# Patient Record
Sex: FEMALE | Race: BLACK OR AFRICAN AMERICAN | NOT HISPANIC OR LATINO | Employment: FULL TIME | URBAN - METROPOLITAN AREA
[De-identification: names, ages, dates, MRNs, and addresses within clinical notes are randomized per-mention and may not be internally consistent; named-entity substitution may affect disease eponyms.]

---

## 2017-06-19 ENCOUNTER — HOSPITAL ENCOUNTER (EMERGENCY)
Facility: HOSPITAL | Age: 22
Discharge: LEFT BEFORE DISCHARGE | End: 2017-06-19
Attending: EMERGENCY MEDICINE | Admitting: EMERGENCY MEDICINE

## 2017-06-19 VITALS
WEIGHT: 235 LBS | SYSTOLIC BLOOD PRESSURE: 118 MMHG | HEART RATE: 104 BPM | RESPIRATION RATE: 20 BRPM | TEMPERATURE: 98.9 F | HEIGHT: 64 IN | DIASTOLIC BLOOD PRESSURE: 68 MMHG | OXYGEN SATURATION: 98 % | BODY MASS INDEX: 40.12 KG/M2

## 2017-06-19 DIAGNOSIS — R07.9 CHEST PAIN, UNSPECIFIED TYPE: Primary | ICD-10-CM

## 2017-06-19 PROCEDURE — 99281 EMR DPT VST MAYX REQ PHY/QHP: CPT

## 2017-06-19 PROCEDURE — 93005 ELECTROCARDIOGRAM TRACING: CPT

## 2017-06-19 NOTE — ED PROVIDER NOTES
Subjective   HPI Comments: PT WITH ANTERIOR CHEST PAIN WORSE WITH MOVEMENT OR DEEP BREATH. PT DENIES FEVER, CHILLS OR OTHER COMPLAINTS.     Patient is a 21 y.o. female presenting with chest pain.   History provided by:  Patient  Chest Pain   Pain location:  Substernal area  Pain quality: dull    Pain radiates to:  Does not radiate  Onset quality:  Gradual  Timing:  Constant  Chronicity:  New  Context: breathing    Context: not intercourse    Relieved by:  Nothing  Worsened by:  Coughing, movement and deep breathing  Ineffective treatments:  None tried  Associated symptoms: no abdominal pain, no anxiety, no back pain, no dysphagia, no numbness, no PND and no shortness of breath        Review of Systems   HENT: Negative for trouble swallowing.    Respiratory: Negative for shortness of breath.    Cardiovascular: Positive for chest pain. Negative for PND.   Gastrointestinal: Negative for abdominal pain.   Musculoskeletal: Negative for back pain.   Neurological: Negative for numbness.   All other systems reviewed and are negative.      Past Medical History:   Diagnosis Date   • Gonorrhea     treated no complications       No Known Allergies    History reviewed. No pertinent surgical history.    Family History   Problem Relation Age of Onset   • No Known Problems Father    • Hypertension Mother    • Breast cancer Neg Hx    • Ovarian cancer Neg Hx    • Uterine cancer Neg Hx    • Diabetes Neg Hx        Social History     Social History   • Marital status: Single     Spouse name: N/A   • Number of children: N/A   • Years of education: N/A     Social History Main Topics   • Smoking status: Current Every Day Smoker     Packs/day: 0.50     Years: 0.00   • Smokeless tobacco: None   • Alcohol use No   • Drug use: None   • Sexual activity: Yes     Birth control/ protection: Implant      Comment: Nexplanon inserted 4/2013     Other Topics Concern   • None     Social History Narrative           Objective   Physical Exam    Constitutional: She appears well-developed.   HENT:   Head: Normocephalic and atraumatic.   Cardiovascular: Normal rate, regular rhythm and normal heart sounds.    Pulmonary/Chest: Effort normal and breath sounds normal. No respiratory distress. She has no wheezes.   MILDLY TENDER ANTERIOR CHEST.    Abdominal: Soft. Bowel sounds are normal. She exhibits no distension. There is no tenderness. There is no guarding.   Musculoskeletal: Normal range of motion. She exhibits no edema.   Neurological: She is alert.   Skin: Skin is warm and dry.   Psychiatric: She has a normal mood and affect. Her behavior is normal. Judgment and thought content normal.   Nursing note and vitals reviewed.      Procedures         ED Course  ED Course                  MDM    Final diagnoses:   Chest pain, unspecified type            CARIDAD Lloyd  06/19/17 9852

## 2017-08-03 ENCOUNTER — APPOINTMENT (OUTPATIENT)
Dept: GENERAL RADIOLOGY | Facility: HOSPITAL | Age: 22
End: 2017-08-03

## 2017-08-03 ENCOUNTER — HOSPITAL ENCOUNTER (OUTPATIENT)
Facility: HOSPITAL | Age: 22
Setting detail: OBSERVATION
Discharge: HOME | End: 2017-08-06
Attending: EMERGENCY MEDICINE | Admitting: FAMILY MEDICINE

## 2017-08-03 DIAGNOSIS — R11.2 NON-INTRACTABLE VOMITING WITH NAUSEA, UNSPECIFIED VOMITING TYPE: ICD-10-CM

## 2017-08-03 DIAGNOSIS — R00.0 SINUS TACHYCARDIA: Primary | ICD-10-CM

## 2017-08-03 DIAGNOSIS — R50.9 LOW GRADE FEVER: ICD-10-CM

## 2017-08-03 PROBLEM — R82.5 POSITIVE URINE DRUG SCREEN: Status: ACTIVE | Noted: 2017-08-03

## 2017-08-03 PROBLEM — R11.10 INTRACTABLE VOMITING: Status: ACTIVE | Noted: 2017-08-03

## 2017-08-03 PROBLEM — Z72.0 TOBACCO ABUSE: Chronic | Status: ACTIVE | Noted: 2017-08-03

## 2017-08-03 PROBLEM — E87.6 HYPOKALEMIA: Status: ACTIVE | Noted: 2017-08-03

## 2017-08-03 LAB
ALBUMIN SERPL-MCNC: 4.6 G/DL (ref 3.2–4.8)
ALBUMIN/GLOB SERPL: 1.3 G/DL (ref 1.5–2.5)
ALP SERPL-CCNC: 58 U/L (ref 25–100)
ALT SERPL W P-5'-P-CCNC: 35 U/L (ref 7–40)
AMPHET+METHAMPHET UR QL: NEGATIVE
AMPHETAMINES UR QL: NEGATIVE
ANION GAP SERPL CALCULATED.3IONS-SCNC: 13 MMOL/L (ref 3–11)
AST SERPL-CCNC: 33 U/L (ref 0–33)
B-HCG UR QL: NEGATIVE
BACTERIA UR QL AUTO: ABNORMAL /HPF
BARBITURATES UR QL SCN: NEGATIVE
BASOPHILS # BLD AUTO: 0.01 10*3/MM3 (ref 0–0.2)
BASOPHILS NFR BLD AUTO: 0.2 % (ref 0–1)
BENZODIAZ UR QL SCN: NEGATIVE
BILIRUB SERPL-MCNC: 0.5 MG/DL (ref 0.3–1.2)
BILIRUB UR QL STRIP: ABNORMAL
BUN BLD-MCNC: 10 MG/DL (ref 9–23)
BUN/CREAT SERPL: 12.5 (ref 7–25)
BUPRENORPHINE SERPL-MCNC: NEGATIVE NG/ML
CALCIUM SPEC-SCNC: 9.4 MG/DL (ref 8.7–10.4)
CANNABINOIDS SERPL QL: POSITIVE
CHLORIDE SERPL-SCNC: 105 MMOL/L (ref 99–109)
CLARITY UR: ABNORMAL
CO2 SERPL-SCNC: 20 MMOL/L (ref 20–31)
COCAINE UR QL: NEGATIVE
COLOR UR: ABNORMAL
CREAT BLD-MCNC: 0.8 MG/DL (ref 0.6–1.3)
D-LACTATE SERPL-SCNC: 1 MMOL/L (ref 0.5–2)
DEPRECATED RDW RBC AUTO: 45.7 FL (ref 37–54)
EOSINOPHIL # BLD AUTO: 0.01 10*3/MM3 (ref 0–0.3)
EOSINOPHIL NFR BLD AUTO: 0.2 % (ref 0–3)
ERYTHROCYTE [DISTWIDTH] IN BLOOD BY AUTOMATED COUNT: 13.1 % (ref 11.3–14.5)
GFR SERPL CREATININE-BSD FRML MDRD: 109 ML/MIN/1.73
GLOBULIN UR ELPH-MCNC: 3.6 GM/DL
GLUCOSE BLD-MCNC: 98 MG/DL (ref 70–100)
GLUCOSE UR STRIP-MCNC: NEGATIVE MG/DL
HCT VFR BLD AUTO: 44.3 % (ref 34.5–44)
HGB BLD-MCNC: 15.2 G/DL (ref 11.5–15.5)
HGB UR QL STRIP.AUTO: ABNORMAL
HOLD SPECIMEN: NORMAL
HOLD SPECIMEN: NORMAL
HYALINE CASTS UR QL AUTO: ABNORMAL /LPF
IMM GRANULOCYTES # BLD: 0.01 10*3/MM3 (ref 0–0.03)
IMM GRANULOCYTES NFR BLD: 0.2 % (ref 0–0.6)
INTERNAL NEGATIVE CONTROL: NEGATIVE
INTERNAL POSITIVE CONTROL: POSITIVE
KETONES UR QL STRIP: ABNORMAL
LEUKOCYTE ESTERASE UR QL STRIP.AUTO: NEGATIVE
LIPASE SERPL-CCNC: 31 U/L (ref 6–51)
LYMPHOCYTES # BLD AUTO: 1.56 10*3/MM3 (ref 0.6–4.8)
LYMPHOCYTES NFR BLD AUTO: 33.5 % (ref 24–44)
Lab: NORMAL
MCH RBC QN AUTO: 32.5 PG (ref 27–31)
MCHC RBC AUTO-ENTMCNC: 34.3 G/DL (ref 32–36)
MCV RBC AUTO: 94.9 FL (ref 80–99)
METHADONE UR QL SCN: NEGATIVE
MONOCYTES # BLD AUTO: 0.34 10*3/MM3 (ref 0–1)
MONOCYTES NFR BLD AUTO: 7.3 % (ref 0–12)
NEUTROPHILS # BLD AUTO: 2.72 10*3/MM3 (ref 1.5–8.3)
NEUTROPHILS NFR BLD AUTO: 58.6 % (ref 41–71)
NITRITE UR QL STRIP: NEGATIVE
OPIATES UR QL: NEGATIVE
OXYCODONE UR QL SCN: NEGATIVE
PCP UR QL SCN: NEGATIVE
PH UR STRIP.AUTO: 6 [PH] (ref 5–8)
PLATELET # BLD AUTO: 240 10*3/MM3 (ref 150–450)
PMV BLD AUTO: 9.4 FL (ref 6–12)
POTASSIUM BLD-SCNC: 3.3 MMOL/L (ref 3.5–5.5)
PROPOXYPH UR QL: NEGATIVE
PROT SERPL-MCNC: 8.2 G/DL (ref 5.7–8.2)
PROT UR QL STRIP: ABNORMAL
RBC # BLD AUTO: 4.67 10*6/MM3 (ref 3.89–5.14)
RBC # UR: ABNORMAL /HPF
REF LAB TEST METHOD: ABNORMAL
SODIUM BLD-SCNC: 138 MMOL/L (ref 132–146)
SP GR UR STRIP: 1.04 (ref 1–1.03)
SQUAMOUS #/AREA URNS HPF: ABNORMAL /HPF
TRICYCLICS UR QL SCN: NEGATIVE
TSH SERPL DL<=0.05 MIU/L-ACNC: 0.52 MIU/ML (ref 0.35–5.35)
UROBILINOGEN UR QL STRIP: ABNORMAL
WBC NRBC COR # BLD: 4.65 10*3/MM3 (ref 3.5–10.8)
WBC UR QL AUTO: ABNORMAL /HPF
WHOLE BLOOD HOLD SPECIMEN: NORMAL
WHOLE BLOOD HOLD SPECIMEN: NORMAL

## 2017-08-03 PROCEDURE — 25010000002 KETOROLAC TROMETHAMINE PER 15 MG: Performed by: PHYSICIAN ASSISTANT

## 2017-08-03 PROCEDURE — 83605 ASSAY OF LACTIC ACID: CPT | Performed by: EMERGENCY MEDICINE

## 2017-08-03 PROCEDURE — 71010 HC CHEST PA OR AP: CPT

## 2017-08-03 PROCEDURE — 81015 MICROSCOPIC EXAM OF URINE: CPT | Performed by: EMERGENCY MEDICINE

## 2017-08-03 PROCEDURE — 85025 COMPLETE CBC W/AUTO DIFF WBC: CPT | Performed by: EMERGENCY MEDICINE

## 2017-08-03 PROCEDURE — 96361 HYDRATE IV INFUSION ADD-ON: CPT

## 2017-08-03 PROCEDURE — 25010000002 CEFTRIAXONE PER 250 MG: Performed by: PHYSICIAN ASSISTANT

## 2017-08-03 PROCEDURE — 87086 URINE CULTURE/COLONY COUNT: CPT | Performed by: EMERGENCY MEDICINE

## 2017-08-03 PROCEDURE — 99219 PR INITIAL OBSERVATION CARE/DAY 50 MINUTES: CPT | Performed by: NURSE PRACTITIONER

## 2017-08-03 PROCEDURE — 96375 TX/PRO/DX INJ NEW DRUG ADDON: CPT

## 2017-08-03 PROCEDURE — 80053 COMPREHEN METABOLIC PANEL: CPT | Performed by: EMERGENCY MEDICINE

## 2017-08-03 PROCEDURE — G0378 HOSPITAL OBSERVATION PER HR: HCPCS

## 2017-08-03 PROCEDURE — 83690 ASSAY OF LIPASE: CPT | Performed by: EMERGENCY MEDICINE

## 2017-08-03 PROCEDURE — 80306 DRUG TEST PRSMV INSTRMNT: CPT | Performed by: FAMILY MEDICINE

## 2017-08-03 PROCEDURE — 25010000002 ONDANSETRON PER 1 MG: Performed by: PHYSICIAN ASSISTANT

## 2017-08-03 PROCEDURE — 25010000003 POTASSIUM CHLORIDE 10 MEQ/100ML SOLUTION: Performed by: NURSE PRACTITIONER

## 2017-08-03 PROCEDURE — 80050 GENERAL HEALTH PANEL: CPT | Performed by: EMERGENCY MEDICINE

## 2017-08-03 PROCEDURE — 96376 TX/PRO/DX INJ SAME DRUG ADON: CPT

## 2017-08-03 PROCEDURE — 99285 EMERGENCY DEPT VISIT HI MDM: CPT

## 2017-08-03 PROCEDURE — 87040 BLOOD CULTURE FOR BACTERIA: CPT | Performed by: EMERGENCY MEDICINE

## 2017-08-03 PROCEDURE — 93005 ELECTROCARDIOGRAM TRACING: CPT | Performed by: PHYSICIAN ASSISTANT

## 2017-08-03 PROCEDURE — 81001 URINALYSIS AUTO W/SCOPE: CPT | Performed by: EMERGENCY MEDICINE

## 2017-08-03 PROCEDURE — 96365 THER/PROPH/DIAG IV INF INIT: CPT

## 2017-08-03 RX ORDER — POTASSIUM CHLORIDE 7.45 MG/ML
10 INJECTION INTRAVENOUS ONCE
Status: COMPLETED | OUTPATIENT
Start: 2017-08-03 | End: 2017-08-04

## 2017-08-03 RX ORDER — KETOROLAC TROMETHAMINE 15 MG/ML
15 INJECTION, SOLUTION INTRAMUSCULAR; INTRAVENOUS ONCE
Status: COMPLETED | OUTPATIENT
Start: 2017-08-03 | End: 2017-08-03

## 2017-08-03 RX ORDER — CEFTRIAXONE SODIUM 1 G/50ML
1 INJECTION, SOLUTION INTRAVENOUS ONCE
Status: COMPLETED | OUTPATIENT
Start: 2017-08-03 | End: 2017-08-03

## 2017-08-03 RX ORDER — FAMOTIDINE 10 MG/ML
20 INJECTION, SOLUTION INTRAVENOUS ONCE
Status: COMPLETED | OUTPATIENT
Start: 2017-08-03 | End: 2017-08-03

## 2017-08-03 RX ORDER — ACETAMINOPHEN 500 MG
1000 TABLET ORAL ONCE
Status: COMPLETED | OUTPATIENT
Start: 2017-08-03 | End: 2017-08-03

## 2017-08-03 RX ORDER — KETOROLAC TROMETHAMINE 15 MG/ML
15 INJECTION, SOLUTION INTRAMUSCULAR; INTRAVENOUS EVERY 6 HOURS PRN
Status: DISCONTINUED | OUTPATIENT
Start: 2017-08-03 | End: 2017-08-03

## 2017-08-03 RX ORDER — ONDANSETRON 2 MG/ML
4 INJECTION INTRAMUSCULAR; INTRAVENOUS ONCE
Status: COMPLETED | OUTPATIENT
Start: 2017-08-03 | End: 2017-08-03

## 2017-08-03 RX ORDER — SODIUM CHLORIDE 0.9 % (FLUSH) 0.9 %
10 SYRINGE (ML) INJECTION AS NEEDED
Status: DISCONTINUED | OUTPATIENT
Start: 2017-08-03 | End: 2017-08-06 | Stop reason: HOSPADM

## 2017-08-03 RX ADMIN — POTASSIUM CHLORIDE 10 MEQ: 7.46 INJECTION, SOLUTION INTRAVENOUS at 23:37

## 2017-08-03 RX ADMIN — CEFTRIAXONE SODIUM 1 G: 1 INJECTION, SOLUTION INTRAVENOUS at 18:31

## 2017-08-03 RX ADMIN — ONDANSETRON 4 MG: 2 INJECTION INTRAMUSCULAR; INTRAVENOUS at 19:31

## 2017-08-03 RX ADMIN — KETOROLAC TROMETHAMINE 15 MG: 15 INJECTION, SOLUTION INTRAMUSCULAR; INTRAVENOUS at 20:01

## 2017-08-03 RX ADMIN — SODIUM CHLORIDE 1000 ML: 9 INJECTION, SOLUTION INTRAVENOUS at 18:24

## 2017-08-03 RX ADMIN — SODIUM CHLORIDE 1000 ML: 9 INJECTION, SOLUTION INTRAVENOUS at 16:05

## 2017-08-03 RX ADMIN — FAMOTIDINE 20 MG: 10 INJECTION, SOLUTION INTRAVENOUS at 14:43

## 2017-08-03 RX ADMIN — ONDANSETRON 4 MG: 2 INJECTION INTRAMUSCULAR; INTRAVENOUS at 14:39

## 2017-08-03 RX ADMIN — SODIUM CHLORIDE 1000 ML: 9 INJECTION, SOLUTION INTRAVENOUS at 14:36

## 2017-08-03 RX ADMIN — ACETAMINOPHEN 1000 MG: 500 TABLET ORAL at 18:24

## 2017-08-03 NOTE — ED PROVIDER NOTES
Subjective   HPI Comments: 21-year-old female presents to the emergency department with complaints of nausea and vomiting multiple times for the past 4 days.  She states that she is unable to keep anything down.  She has had no associated diarrhea, in fact she has had some constipation.  Her last bowel movement was about one week ago.  She denies any abdominal pain.  No urinary symptoms.  She is currently on her menstrual period.  No past pertinent medical history.  No abdominal surgeries.  She smokes a half pack cigarettes daily.  No alcohol or drug use.  She has no PCP.    Patient is a 21 y.o. female presenting with vomiting.   History provided by:  Patient  Vomiting   The primary symptoms include nausea and vomiting. Primary symptoms do not include fever, abdominal pain, diarrhea, dysuria, arthralgias or rash. The illness began 3 to 5 days ago. The onset was gradual.   The illness is also significant for chills and constipation. The illness does not include back pain.       Review of Systems   Constitutional: Positive for chills. Negative for fever.   HENT: Negative for congestion, ear pain, nosebleeds, rhinorrhea and sore throat.    Eyes: Negative for pain, discharge and visual disturbance.   Respiratory: Negative for shortness of breath and wheezing.    Cardiovascular: Negative for chest pain, palpitations and leg swelling.   Gastrointestinal: Positive for constipation, nausea and vomiting. Negative for abdominal pain, blood in stool and diarrhea.   Endocrine: Negative.    Genitourinary: Negative for dysuria, hematuria and urgency.   Musculoskeletal: Negative for arthralgias and back pain.   Skin: Negative for pallor and rash.   Allergic/Immunologic: Negative for immunocompromised state.   Neurological: Negative for dizziness, speech difficulty, weakness and headaches.   Hematological: Negative for adenopathy. Does not bruise/bleed easily.   Psychiatric/Behavioral: Negative.        Past Medical History:    Diagnosis Date   • Gonorrhea     treated no complications       No Known Allergies    History reviewed. No pertinent surgical history.    Family History   Problem Relation Age of Onset   • No Known Problems Father    • Hypertension Mother    • Breast cancer Neg Hx    • Ovarian cancer Neg Hx    • Uterine cancer Neg Hx    • Diabetes Neg Hx        Social History     Social History   • Marital status: Single     Spouse name: N/A   • Number of children: N/A   • Years of education: N/A     Social History Main Topics   • Smoking status: Current Every Day Smoker     Packs/day: 0.50     Years: 5.00   • Smokeless tobacco: None   • Alcohol use No   • Drug use: No   • Sexual activity: Yes     Birth control/ protection: Implant      Comment: Nexplanon inserted 4/2013     Other Topics Concern   • None     Social History Narrative   • None           Objective   Physical Exam   Constitutional: She is oriented to person, place, and time. She appears well-developed and well-nourished. No distress.   HENT:   Head: Normocephalic and atraumatic.   Nose: Nose normal.   Mouth/Throat: Oropharynx is clear and moist.   Eyes: EOM are normal. Pupils are equal, round, and reactive to light. No scleral icterus.   Neck: Normal range of motion. Neck supple.   Cardiovascular: Regular rhythm, normal heart sounds and intact distal pulses.    No murmur heard.  Mildly tachycardic at 104   Pulmonary/Chest: Effort normal and breath sounds normal. No respiratory distress. She has no wheezes. She has no rales. She exhibits no tenderness.   Abdominal: Soft. Bowel sounds are normal. There is no tenderness. There is no rebound and no guarding.   Musculoskeletal: Normal range of motion. She exhibits no edema or tenderness.   Neurological: She is alert and oriented to person, place, and time.   Skin: Skin is warm and dry. No rash noted. She is not diaphoretic.   Psychiatric: She has a normal mood and affect.   Nursing note and vitals  reviewed.      Procedures         ED Course  ED Course   Value Comment By Time   Temp: 99.1 °F (37.3 °C) (Reviewed) CARIDAD Patterson 08/03 1656   Temp: 99.1 °F (37.3 °C) (Reviewed) CARIDAD Patterson 08/03 1656    8:55 PM  Pt has been tachycardic throughout her visit so far, with rate in the 120s.  She has had liters of saline and on her third.  Her exam is benign.  She has a few WBCs in her urine, no bacteria seen.  She had a low grade fever of 99.1.  Given her tachycardia and questionable urine, she was given a dose of rocephin.  She has also been given tylenol to see if her rate comes down with resolution of her low grade fever.    8:55 PM  Pt now c/o occipital headache.  She states the headache began after getting the Tylenol.  She did not have a headache on arrival.  No neck pain or stiffness.  No meningeal signs.  Will give dose of Toradol and recheck.    8:55 PM  Pt's headache is some better after Toradol but not completely resolved.  She is still tachycardic after 3 liters of saline, Tylenol and Toradol.  Repeat abdominal exam is benign.  No menigeal signs.  I think this is still a viral syndrome, but with her tachycardia, I think she warrants admission.  Will also add on a TSH and chest xray.      Recent Results (from the past 24 hour(s))   Comprehensive Metabolic Panel    Collection Time: 08/03/17  2:06 PM   Result Value Ref Range    Glucose 98 70 - 100 mg/dL    BUN 10 9 - 23 mg/dL    Creatinine 0.80 0.60 - 1.30 mg/dL    Sodium 138 132 - 146 mmol/L    Potassium 3.3 (L) 3.5 - 5.5 mmol/L    Chloride 105 99 - 109 mmol/L    CO2 20.0 20.0 - 31.0 mmol/L    Calcium 9.4 8.7 - 10.4 mg/dL    Total Protein 8.2 5.7 - 8.2 g/dL    Albumin 4.60 3.20 - 4.80 g/dL    ALT (SGPT) 35 7 - 40 U/L    AST (SGOT) 33 0 - 33 U/L    Alkaline Phosphatase 58 25 - 100 U/L    Total Bilirubin 0.5 0.3 - 1.2 mg/dL    eGFR  African Amer 109 >60 mL/min/1.73    Globulin 3.6 gm/dL    A/G Ratio 1.3 (L) 1.5 - 2.5 g/dL    BUN/Creatinine Ratio  12.5 7.0 - 25.0    Anion Gap 13.0 (H) 3.0 - 11.0 mmol/L   Lipase    Collection Time: 08/03/17  2:06 PM   Result Value Ref Range    Lipase 31 6 - 51 U/L   Light Blue Top    Collection Time: 08/03/17  2:06 PM   Result Value Ref Range    Extra Tube hold for add-on    Gold Top - SST    Collection Time: 08/03/17  2:06 PM   Result Value Ref Range    Extra Tube Hold for add-ons.    CBC Auto Differential    Collection Time: 08/03/17  2:06 PM   Result Value Ref Range    WBC 4.65 3.50 - 10.80 10*3/mm3    RBC 4.67 3.89 - 5.14 10*6/mm3    Hemoglobin 15.2 11.5 - 15.5 g/dL    Hematocrit 44.3 (H) 34.5 - 44.0 %    MCV 94.9 80.0 - 99.0 fL    MCH 32.5 (H) 27.0 - 31.0 pg    MCHC 34.3 32.0 - 36.0 g/dL    RDW 13.1 11.3 - 14.5 %    RDW-SD 45.7 37.0 - 54.0 fl    MPV 9.4 6.0 - 12.0 fL    Platelets 240 150 - 450 10*3/mm3    Neutrophil % 58.6 41.0 - 71.0 %    Lymphocyte % 33.5 24.0 - 44.0 %    Monocyte % 7.3 0.0 - 12.0 %    Eosinophil % 0.2 0.0 - 3.0 %    Basophil % 0.2 0.0 - 1.0 %    Immature Grans % 0.2 0.0 - 0.6 %    Neutrophils, Absolute 2.72 1.50 - 8.30 10*3/mm3    Lymphocytes, Absolute 1.56 0.60 - 4.80 10*3/mm3    Monocytes, Absolute 0.34 0.00 - 1.00 10*3/mm3    Eosinophils, Absolute 0.01 0.00 - 0.30 10*3/mm3    Basophils, Absolute 0.01 0.00 - 0.20 10*3/mm3    Immature Grans, Absolute 0.01 0.00 - 0.03 10*3/mm3   Lactic Acid, Plasma    Collection Time: 08/03/17  2:06 PM   Result Value Ref Range    Lactate 1.0 0.5 - 2.0 mmol/L   Green Top (Gel)    Collection Time: 08/03/17  2:06 PM   Result Value Ref Range    Extra Tube Hold for add-ons.    Lavender Top    Collection Time: 08/03/17  2:06 PM   Result Value Ref Range    Extra Tube hold for add-on    Urinalysis With / Culture If Indicated    Collection Time: 08/03/17  2:23 PM   Result Value Ref Range    Color, UA Dark Yellow (A) Yellow, Straw    Appearance, UA Cloudy (A) Clear    pH, UA 6.0 5.0 - 8.0    Specific Gravity, UA 1.037 (H) 1.001 - 1.030    Glucose, UA Negative Negative     Ketones, UA >=160 mg/dL (4+) (A) Negative    Bilirubin, UA Moderate (2+) (A) Negative    Blood, UA Large (3+) (A) Negative    Protein, UA 30 mg/dL (1+) (A) Negative    Leuk Esterase, UA Negative Negative    Nitrite, UA Negative Negative    Urobilinogen, UA 1.0 E.U./dL 0.2 - 1.0 E.U./dL   Urinalysis, Microscopic Only    Collection Time: 08/03/17  2:23 PM   Result Value Ref Range    RBC, UA 7-12 (A) None Seen, 0-2 /HPF    WBC, UA 6-12 (A) None Seen /HPF    Bacteria, UA None Seen None Seen, Trace /HPF    Squamous Epithelial Cells, UA 3-6 (A) None Seen, 0-2 /HPF    Hyaline Casts, UA 21-30 0 - 6 /LPF    Methodology Automated Microscopy    POCT pregnancy, urine    Collection Time: 08/03/17  2:29 PM   Result Value Ref Range    HCG, Urine, QL Negative Negative    Lot Number [id number]     Internal Positive Control Positive     Internal Negative Control Negative      Note: In addition to lab results from this visit, the labs listed above may include labs taken at another facility or during a different encounter within the last 24 hours. Please correlate lab times with ED admission and discharge times for further clarification of the services performed during this visit.    XR Chest 1 View    (Results Pending)     Vitals:    08/03/17 1900 08/03/17 1933 08/03/17 2000 08/03/17 2013   BP: 107/66 125/88 131/81    BP Location: Right arm Right arm Right arm    Patient Position: Lying Lying Lying    Pulse: 109 108 107 116   Resp:  20     Temp:       TempSrc:       SpO2: 99% 99% 100% 100%   Weight:       Height:         Medications   sodium chloride 0.9 % flush 10 mL (not administered)   sodium chloride 0.9 % bolus 1,000 mL (0 mL Intravenous Stopped 8/3/17 1659)   ondansetron (ZOFRAN) injection 4 mg (4 mg Intravenous Given 8/3/17 1439)   famotidine (PEPCID) injection 20 mg (20 mg Intravenous Given 8/3/17 1443)   sodium chloride 0.9 % bolus 1,000 mL (0 mL Intravenous Stopped 8/3/17 1809)   sodium chloride 0.9 % bolus 1,000 mL (1,000  mL Intravenous New Bag 8/3/17 1824)   acetaminophen (TYLENOL) tablet 1,000 mg (1,000 mg Oral Given 8/3/17 1824)   cefTRIAXone (ROCEPHIN) IVPB 1 g (0 g Intravenous Stopped 8/3/17 1933)   ondansetron (ZOFRAN) injection 4 mg (4 mg Intravenous Given 8/3/17 1931)   ketorolac (TORADOL) injection 15 mg (15 mg Intravenous Given 8/3/17 2001)     ECG/EMG Results (last 24 hours)     ** No results found for the last 24 hours. **                    [facility]    Final diagnoses:   Sinus tachycardia   Non-intractable vomiting with nausea, unspecified vomiting type   Low grade fever            CARIDAD Patterson  08/03/17 9168

## 2017-08-04 LAB
ALBUMIN SERPL-MCNC: 3.9 G/DL (ref 3.2–4.8)
ALBUMIN/GLOB SERPL: 1.3 G/DL (ref 1.5–2.5)
ALP SERPL-CCNC: 51 U/L (ref 25–100)
ALT SERPL W P-5'-P-CCNC: 23 U/L (ref 7–40)
ANION GAP SERPL CALCULATED.3IONS-SCNC: 7 MMOL/L (ref 3–11)
AST SERPL-CCNC: 30 U/L (ref 0–33)
BASOPHILS # BLD AUTO: 0.02 10*3/MM3 (ref 0–0.2)
BASOPHILS NFR BLD AUTO: 0.4 % (ref 0–1)
BILIRUB SERPL-MCNC: 0.5 MG/DL (ref 0.3–1.2)
BUN BLD-MCNC: 7 MG/DL (ref 9–23)
BUN/CREAT SERPL: 10 (ref 7–25)
CALCIUM SPEC-SCNC: 8.7 MG/DL (ref 8.7–10.4)
CHLORIDE SERPL-SCNC: 105 MMOL/L (ref 99–109)
CO2 SERPL-SCNC: 23 MMOL/L (ref 20–31)
CREAT BLD-MCNC: 0.7 MG/DL (ref 0.6–1.3)
DEPRECATED RDW RBC AUTO: 45.1 FL (ref 37–54)
EOSINOPHIL # BLD AUTO: 0.01 10*3/MM3 (ref 0–0.3)
EOSINOPHIL NFR BLD AUTO: 0.2 % (ref 0–3)
ERYTHROCYTE [DISTWIDTH] IN BLOOD BY AUTOMATED COUNT: 12.9 % (ref 11.3–14.5)
GFR SERPL CREATININE-BSD FRML MDRD: 127 ML/MIN/1.73
GLOBULIN UR ELPH-MCNC: 3.1 GM/DL
GLUCOSE BLD-MCNC: 86 MG/DL (ref 70–100)
GLUCOSE BLDC GLUCOMTR-MCNC: 94 MG/DL (ref 70–130)
HCT VFR BLD AUTO: 39.6 % (ref 34.5–44)
HGB BLD-MCNC: 13.1 G/DL (ref 11.5–15.5)
IMM GRANULOCYTES # BLD: 0.01 10*3/MM3 (ref 0–0.03)
IMM GRANULOCYTES NFR BLD: 0.2 % (ref 0–0.6)
LYMPHOCYTES # BLD AUTO: 2.4 10*3/MM3 (ref 0.6–4.8)
LYMPHOCYTES NFR BLD AUTO: 42.4 % (ref 24–44)
MCH RBC QN AUTO: 31.7 PG (ref 27–31)
MCHC RBC AUTO-ENTMCNC: 33.1 G/DL (ref 32–36)
MCV RBC AUTO: 95.9 FL (ref 80–99)
MONOCYTES # BLD AUTO: 0.54 10*3/MM3 (ref 0–1)
MONOCYTES NFR BLD AUTO: 9.5 % (ref 0–12)
NEUTROPHILS # BLD AUTO: 2.68 10*3/MM3 (ref 1.5–8.3)
NEUTROPHILS NFR BLD AUTO: 47.3 % (ref 41–71)
PLATELET # BLD AUTO: 211 10*3/MM3 (ref 150–450)
PMV BLD AUTO: 9.1 FL (ref 6–12)
POTASSIUM BLD-SCNC: 3.6 MMOL/L (ref 3.5–5.5)
PROT SERPL-MCNC: 7 G/DL (ref 5.7–8.2)
RBC # BLD AUTO: 4.13 10*6/MM3 (ref 3.89–5.14)
SODIUM BLD-SCNC: 135 MMOL/L (ref 132–146)
TSH SERPL DL<=0.05 MIU/L-ACNC: 0.74 MIU/ML (ref 0.35–5.35)
WBC NRBC COR # BLD: 5.66 10*3/MM3 (ref 3.5–10.8)

## 2017-08-04 PROCEDURE — G0378 HOSPITAL OBSERVATION PER HR: HCPCS

## 2017-08-04 PROCEDURE — 96375 TX/PRO/DX INJ NEW DRUG ADDON: CPT

## 2017-08-04 PROCEDURE — 25010000002 ONDANSETRON PER 1 MG: Performed by: NURSE PRACTITIONER

## 2017-08-04 PROCEDURE — 80053 COMPREHEN METABOLIC PANEL: CPT | Performed by: NURSE PRACTITIONER

## 2017-08-04 PROCEDURE — 25010000002 PROMETHAZINE PER 50 MG: Performed by: HOSPITALIST

## 2017-08-04 PROCEDURE — 93005 ELECTROCARDIOGRAM TRACING: CPT | Performed by: NURSE PRACTITIONER

## 2017-08-04 PROCEDURE — 93010 ELECTROCARDIOGRAM REPORT: CPT | Performed by: INTERNAL MEDICINE

## 2017-08-04 PROCEDURE — 80050 GENERAL HEALTH PANEL: CPT | Performed by: NURSE PRACTITIONER

## 2017-08-04 PROCEDURE — 99226 PR SBSQ OBSERVATION CARE/DAY 35 MINUTES: CPT | Performed by: HOSPITALIST

## 2017-08-04 PROCEDURE — 85025 COMPLETE CBC W/AUTO DIFF WBC: CPT | Performed by: NURSE PRACTITIONER

## 2017-08-04 PROCEDURE — 82962 GLUCOSE BLOOD TEST: CPT

## 2017-08-04 PROCEDURE — 96376 TX/PRO/DX INJ SAME DRUG ADON: CPT

## 2017-08-04 PROCEDURE — 63710000001 PROMETHAZINE PER 12.5 MG: Performed by: HOSPITALIST

## 2017-08-04 RX ORDER — ONDANSETRON 4 MG/1
4 TABLET, FILM COATED ORAL ONCE
Qty: 15 TABLET | Refills: 0 | Status: SHIPPED | OUTPATIENT
Start: 2017-08-04 | End: 2017-08-04

## 2017-08-04 RX ORDER — PROMETHAZINE HYDROCHLORIDE 25 MG/ML
12.5 INJECTION, SOLUTION INTRAMUSCULAR; INTRAVENOUS EVERY 6 HOURS PRN
Status: DISCONTINUED | OUTPATIENT
Start: 2017-08-04 | End: 2017-08-06 | Stop reason: HOSPADM

## 2017-08-04 RX ORDER — PROMETHAZINE HYDROCHLORIDE 12.5 MG/1
12.5 TABLET ORAL EVERY 6 HOURS PRN
Qty: 15 TABLET | Refills: 0 | Status: SHIPPED | OUTPATIENT
Start: 2017-08-04 | End: 2017-08-06

## 2017-08-04 RX ORDER — ONDANSETRON 2 MG/ML
4 INJECTION INTRAMUSCULAR; INTRAVENOUS EVERY 6 HOURS PRN
Status: DISCONTINUED | OUTPATIENT
Start: 2017-08-04 | End: 2017-08-06 | Stop reason: HOSPADM

## 2017-08-04 RX ORDER — ONDANSETRON 4 MG/1
4 TABLET, FILM COATED ORAL EVERY 6 HOURS PRN
Status: DISCONTINUED | OUTPATIENT
Start: 2017-08-04 | End: 2017-08-04 | Stop reason: SDUPTHER

## 2017-08-04 RX ORDER — ONDANSETRON 4 MG/1
4 TABLET, FILM COATED ORAL ONCE
Qty: 15 TABLET | Refills: 0 | Status: SHIPPED | OUTPATIENT
Start: 2017-08-04 | End: 2017-08-04 | Stop reason: HOSPADM

## 2017-08-04 RX ORDER — ACETAMINOPHEN 325 MG/1
650 TABLET ORAL EVERY 4 HOURS PRN
Status: DISCONTINUED | OUTPATIENT
Start: 2017-08-04 | End: 2017-08-06 | Stop reason: HOSPADM

## 2017-08-04 RX ORDER — ONDANSETRON 4 MG/1
4 TABLET, FILM COATED ORAL EVERY 6 HOURS PRN
Status: DISCONTINUED | OUTPATIENT
Start: 2017-08-04 | End: 2017-08-06 | Stop reason: HOSPADM

## 2017-08-04 RX ORDER — SODIUM CHLORIDE 0.9 % (FLUSH) 0.9 %
1-10 SYRINGE (ML) INJECTION AS NEEDED
Status: DISCONTINUED | OUTPATIENT
Start: 2017-08-04 | End: 2017-08-06 | Stop reason: HOSPADM

## 2017-08-04 RX ORDER — PROMETHAZINE HYDROCHLORIDE 12.5 MG/1
12.5 TABLET ORAL EVERY 6 HOURS PRN
Status: DISCONTINUED | OUTPATIENT
Start: 2017-08-04 | End: 2017-08-06 | Stop reason: HOSPADM

## 2017-08-04 RX ADMIN — SODIUM CHLORIDE 1000 ML: 9 INJECTION, SOLUTION INTRAVENOUS at 14:59

## 2017-08-04 RX ADMIN — PROMETHAZINE HYDROCHLORIDE 12.5 MG: 12.5 TABLET ORAL at 12:59

## 2017-08-04 RX ADMIN — ONDANSETRON 4 MG: 4 TABLET, FILM COATED ORAL at 10:34

## 2017-08-04 RX ADMIN — ONDANSETRON 4 MG: 2 INJECTION INTRAMUSCULAR; INTRAVENOUS at 17:19

## 2017-08-04 RX ADMIN — Medication: at 14:59

## 2017-08-04 RX ADMIN — SODIUM CHLORIDE 1000 ML: 9 INJECTION, SOLUTION INTRAVENOUS at 12:52

## 2017-08-04 RX ADMIN — PROMETHAZINE HYDROCHLORIDE 12.5 MG: 25 INJECTION INTRAMUSCULAR; INTRAVENOUS at 20:32

## 2017-08-04 NOTE — PLAN OF CARE
Problem: Constipation (Adult)  Goal: Identify Related Risk Factors and Signs and Symptoms  Outcome: Ongoing (interventions implemented as appropriate)    08/04/17 1054   Constipation   Signs and Symptoms (Constipation) feeling full;nausea and vomiting       Goal: Effective Bowel Elimination  Outcome: Ongoing (interventions implemented as appropriate)  Goal: Comfort  Outcome: Ongoing (interventions implemented as appropriate)

## 2017-08-04 NOTE — DISCHARGE SUMMARY
[facility] Medicine Services  DISCHARGE SUMMARY       Date of Admission: 8/3/2017  Date of Discharge:  8/4/2017  Primary Care Physician: No Known Provider  Consulting Physician(s)          None           Discharge Diagnoses:  Active Hospital Problems (** Indicates Principal Problem)    Diagnosis Date Noted   • **Intractable vomiting [R11.10] 08/03/2017   • Hypokalemia [E87.6] 08/03/2017   • Positive urine drug screen [R82.5] 08/03/2017   • Tobacco abuse [Z72.0] 08/03/2017   • Sinus tachycardia [R00.0] 08/03/2017      Resolved Hospital Problems    Diagnosis Date Noted Date Resolved   No resolved problems to display.       Presenting Problem/History of Present Illness  Sinus tachycardia [R00.0]  Sinus tachycardia [R00.0]     Chief Complaint on Day of Discharge:     NONE    History of Present Illness on Day of Discharge:     Tolerating po. No f/c. No n/v. No dyspnea. No dysuria. No abdominal pain, ready to go home.    Hospital Course  Patient is a 21 y.o. female presented with:    N/V  Tachycardia  Volume depletion    She presented with nausea and vomiting possibly related to THC use but maybe related to a viral syndrome. Her symptoms resolved with IVF and Zofran. She will be discharged home. Her insurance does not cover zofran so well will prescribe phenergan.    Procedures Performed         Consults:   Consults     No orders found for last 30 day(s).          Pertinent Test Results:      Results        Procedure Component Value Units Date/Time       CBC Auto Differential [[id number]] (Abnormal) Collected: 08/04/17 0446       Lab Status: Final result Specimen: Blood Updated: 08/04/17 0523        WBC 5.66 10*3/mm3         RBC 4.13 10*6/mm3         Hemoglobin 13.1 g/dL         Hematocrit 39.6 %         MCV 95.9 fL         MCH 31.7 (H) pg         MCHC 33.1 g/dL         RDW 12.9 %         RDW-SD 45.1 fl         MPV 9.1 fL         Platelets 211 10*3/mm3         Neutrophil % 47.3 %          Lymphocyte % 42.4 %         Monocyte % 9.5 %         Eosinophil % 0.2 %         Basophil % 0.4 %         Immature Grans % 0.2 %         Neutrophils, Absolute 2.68 10*3/mm3         Lymphocytes, Absolute 2.40 10*3/mm3         Monocytes, Absolute 0.54 10*3/mm3         Eosinophils, Absolute 0.01 10*3/mm3         Basophils, Absolute 0.02 10*3/mm3         Immature Grans, Absolute 0.01 10*3/mm3        Comprehensive Metabolic Panel [[id number]] (Abnormal) Collected: 08/04/17 0446       Lab Status: Final result Specimen: Blood Updated: 08/04/17 0629        Glucose 86 mg/dL         BUN 7 (L) mg/dL         Creatinine 0.70 mg/dL         Sodium 135 mmol/L         Potassium 3.6 mmol/L         Chloride 105 mmol/L         CO2 23.0 mmol/L         Calcium 8.7 mg/dL         Total Protein 7.0 g/dL         Albumin 3.90 g/dL         ALT (SGPT) 23 U/L         AST (SGOT) 30 U/L         Alkaline Phosphatase 51 U/L         Total Bilirubin 0.5 mg/dL         eGFR  African Amer 127 mL/min/1.73         Globulin 3.1 gm/dL         A/G Ratio 1.3 (L) g/dL         BUN/Creatinine Ratio 10.0        Anion Gap 7.0 mmol/L        Narrative:         National Kidney Foundation Guidelines    Stage     Description        GFR  1         Normal or High     90+  2         Mild decrease      60-89  3         Moderate decrease  30-59  4         Severe decrease    15-29  5         Kidney failure     <15       TSH [[id number]] (Normal) Collected: 08/04/17 0446       Lab Status: Final result Specimen: Blood Updated: 08/04/17 0629        TSH 0.738 mIU/mL        Urinalysis With / Culture If Indicated [[id number]] (Abnormal) Collected: 08/03/17 1423       Lab Status: Final result Specimen: Urine from Urine, Clean Catch Updated: 08/03/17 1446        Color, UA Dark Yellow (A)        Appearance, UA Cloudy (A)        pH, UA 6.0        Specific Gravity, UA 1.037 (H)        Glucose, UA Negative        Ketones, UA >=160 mg/dL (4+) (A)        Bilirubin, UA Moderate (2+) (A)         Blood, UA Large (3+) (A)        Protein, UA 30 mg/dL (1+) (A)        Leuk Esterase, UA Negative        Nitrite, UA Negative        Urobilinogen, UA 1.0 E.U./dL       Urinalysis, Microscopic Only [[id number]] (Abnormal) Collected: 08/03/17 1423       Lab Status: Final result Specimen: Urine from Urine, Clean Catch Updated: 08/03/17 1446        RBC, UA 7-12 (A) /HPF         WBC, UA 6-12 (A) /HPF         Bacteria, UA None Seen /HPF         Squamous Epithelial Cells, UA 3-6 (A) /HPF         Hyaline Casts, UA 21-30 /LPF         Methodology Automated Microscopy       Urine Culture [[id number]] (Normal) Collected: 08/03/17 1423       Lab Status: Preliminary result Specimen: Urine from Urine, Clean Catch Updated: 08/04/17 0905        Urine Culture No growth       Urine Drug Screen [[id number]] (Abnormal) Collected: 08/03/17 1423       Lab Status: Final result Specimen: Urine from Urine, Clean Catch Updated: 08/03/17 2124        THC, Screen, Urine Positive (A)        Phencyclidine (PCP), Urine Negative        Cocaine Screen, Urine Negative        Methamphetamine, Urine Negative        Opiate Screen Negative        Amphetamine Screen, Urine Negative        Benzodiazepine Screen, Urine Negative        Tricyclic Antidepressants Screen Negative        Methadone Screen, Urine Negative        Barbiturates Screen, Urine Negative        Oxycodone Screen, Urine Negative        Propoxyphene Screen Negative        Buprenorphine, Screen, Urine Negative       Narrative:         Cutoff For Drugs Screened:    Amphetamines               500 ng/ml  Barbiturates               200 ng/ml  Benzodiazepines            150 ng/ml  Cocaine                    150 ng/ml  Methadone                  200 ng/ml  Opiates                    100 ng/ml  Phencyclidine               25 ng/ml  THC                         50 ng/ml  Methamphetamine            500 ng/ml  Tricyclic Antidepressants  300 ng/ml  Oxycodone                  100 ng/ml  Propoxyphene                300 ng/ml  Buprenorphine               10 ng/ml    The normal value for all drugs tested is negative. This report includes unconfirmed screening results, with the cutoff values listed, to be used for medical treatment purposes only.  Unconfirmed results must not be used for non-medical purposes such as employment or legal testing.  Clinical consideration should be applied to any drug of abuse test, particularly when unconfirmed results are used.         [facility] Draw [[id number]] Collected: 08/03/17 1406       Lab Status: Final result Specimen: Blood Updated: 08/04/17 0626       Narrative:         The following orders were created for panel order [facility].  Procedure                               Abnormality         Status                     ---------                               -----------         ------                     Light Blue Top[[id number]]                                    Final result               Green Top (Gel)[[id number]]                                                              Lavender Top[[id number]]                                                                 Gold Top - SST[[id number]]                                    Final result               Green Top (No Gel)[[id number]]                                                            Please view results for these tests on the individual orders.       Comprehensive Metabolic Panel [[id number]] (Abnormal) Collected: 08/03/17 1406       Lab Status: Final result Specimen: Blood Updated: 08/03/17 1443        Glucose 98 mg/dL         BUN 10 mg/dL         Creatinine 0.80 mg/dL         Sodium 138 mmol/L         Potassium 3.3 (L) mmol/L         Chloride 105 mmol/L         CO2 20.0 mmol/L         Calcium 9.4 mg/dL         Total Protein 8.2 g/dL         Albumin 4.60 g/dL         ALT (SGPT) 35 U/L         AST (SGOT) 33 U/L         Alkaline Phosphatase 58 U/L         Total Bilirubin 0.5 mg/dL         eGFR  African Amer 109 mL/min/1.73         Globulin  3.6 gm/dL         A/G Ratio 1.3 (L) g/dL         BUN/Creatinine Ratio 12.5        Anion Gap 13.0 (H) mmol/L        Narrative:         National Kidney Foundation Guidelines    Stage     Description        GFR  1         Normal or High     90+  2         Mild decrease      60-89  3         Moderate decrease  30-59  4         Severe decrease    15-29  5         Kidney failure     <15       Lipase [[id number]] (Normal) Collected: 08/03/17 1406       Lab Status: Final result Specimen: Blood Updated: 08/03/17 1443        Lipase 31 U/L        CBC Auto Differential [[id number]] (Abnormal) Collected: 08/03/17 1406       Lab Status: Final result Specimen: Blood Updated: 08/03/17 1422        WBC 4.65 10*3/mm3         RBC 4.67 10*6/mm3         Hemoglobin 15.2 g/dL         Hematocrit 44.3 (H) %         MCV 94.9 fL         MCH 32.5 (H) pg         MCHC 34.3 g/dL         RDW 13.1 %         RDW-SD 45.7 fl         MPV 9.4 fL         Platelets 240 10*3/mm3         Neutrophil % 58.6 %         Lymphocyte % 33.5 %         Monocyte % 7.3 %         Eosinophil % 0.2 %         Basophil % 0.2 %         Immature Grans % 0.2 %         Neutrophils, Absolute 2.72 10*3/mm3         Lymphocytes, Absolute 1.56 10*3/mm3         Monocytes, Absolute 0.34 10*3/mm3         Eosinophils, Absolute 0.01 10*3/mm3         Basophils, Absolute 0.01 10*3/mm3         Immature Grans, Absolute 0.01 10*3/mm3        Lactic Acid, Plasma [[id number]] (Normal) Collected: 08/03/17 1406       Lab Status: Final result Specimen: Blood Updated: 08/03/17 1439        Lactate 1.0 mmol/L        [facility] Draw [[id number]] Collected: 08/03/17 1406       Lab Status: Final result Specimen: Blood Updated: 08/04/17 0626       Narrative:         The following orders were created for panel order [facility] Draw.  Procedure                               Abnormality         Status                     ---------                               -----------         ------                     Light Blue  "Top[[id number]]                                                              Green Top (Gel)[[id number]]                                  Final result               Lavender Top[[id number]]                                     Final result               Gold Top - SST[[id number]]                                                              Green Top (No Gel)[[id number]]                                                            Please view results for these tests on the individual orders.       TSH [[id number]] (Normal) Collected: 08/03/17 1406       Lab Status: Final result Specimen: Blood Updated: 08/03/17 2158        TSH 0.518 mIU/mL           Condition on Discharge:  Stable/improved    Physical Exam on Discharge:/75 (BP Location: Right arm, Patient Position: Lying)  Pulse 70  Temp 97.8 °F (36.6 °C) (Oral)   Resp 18  Ht 64\" (162.6 cm)  Wt 244 lb 12.8 oz (111 kg)  SpO2 96%  BMI 42.02 kg/m2  Physical Exam  NAD  Alert and oriented  Op clear, MMM  Neck supple  RRR  CTAB  +BS, ND, NT  GUADARRAMA  No edema  Normal affect    Discharge Disposition  Home or Self Care    Discharge Medications   Naomi Massey   Home Medication Instructions AMANDA:[id number]    Printed on:08/04/17 1149   Medication Information                      Omeprazole (PRILOSEC PO)  Take  by mouth.             promethazine (PHENERGAN) 12.5 MG tablet  Take 1 tablet by mouth Every 6 (Six) Hours As Needed for Nausea or Vomiting.             RANITIDINE HCL PO  Take  by mouth.                 Discharge Diet:   Diet Instructions     Advance Diet As Tolerated                     Discharge Care Plan / Instructions:    Activity at Discharge:   Activity Instructions     Activity as Tolerated                     Follow-up Appointments  No future appointments.  Additional Instructions for the Follow-ups that You Need to Schedule     Additional Discharge Follow-Up (Specify Provider)    As directed    To:  Needs to establish PCP, can give list of Religious docs "   Follow Up:  6 Weeks   Has the patient’s follow-up appointment been scheduled and documented in the discharge navigator?:  Patient to schedule, documented in the follow-up section                 Test Results Pending at Discharge   Order Current Status    Blood Culture Preliminary result    Blood Culture Preliminary result    Urine Culture Preliminary result           Cristian Guerra MD 08/04/17 11:49 AM    Time: 40 minutes    Please note that portions of this note may have been completed with a voice recognition program. Efforts were made to edit the dictations, but occasionally words are mistranscribed.

## 2017-08-04 NOTE — H&P
"    [facility] Medicine Services  HISTORY AND PHYSICAL    Primary Care Physician: No Known Provider    Subjective     Chief Complaint:  Nausea, vomiting    History of Present Illness:   This is a 21 year old  female who presents to the ED with a four day history of nausea and vomiting. Patient states she has been unable to keep anything oral down in four days.  Additionally she states the nausea is worse in the AM and after she eats.  She denies any prior history, she denies any fever, chills, shortness of air, diarrhea, abdominal pain or chest pain.  She does admit to using marijuana two weeks ago but states \"i only tried it once.\"  She will be admitted to [facility] under the care of the Hospitalist for further evaluation and treatment.        Review of Systems   Constitutional: Positive for appetite change. Negative for activity change, chills, diaphoresis, fatigue, fever and unexpected weight change.   HENT: Negative.    Eyes: Negative.    Respiratory: Negative for cough, chest tightness and shortness of breath.    Cardiovascular: Negative for chest pain, palpitations and leg swelling.   Gastrointestinal: Positive for nausea and vomiting. Negative for abdominal distention, abdominal pain, blood in stool, constipation and diarrhea.   Endocrine: Negative.    Genitourinary: Negative for difficulty urinating, dysuria, flank pain, frequency and hematuria.   Musculoskeletal: Negative.    Skin: Negative for color change, pallor, rash and wound.   Allergic/Immunologic: Negative.    Neurological: Negative for dizziness, facial asymmetry, weakness and headaches.   Psychiatric/Behavioral: Negative for agitation, behavioral problems and confusion. The patient is not nervous/anxious.       Otherwise complete ROS performed and negative except as mentioned in the HPI.    Past Medical History:   Diagnosis Date   • Gonorrhea     treated no complications       History reviewed. No pertinent " "surgical history.    Family History   Problem Relation Age of Onset   • No Known Problems Father    • Hypertension Mother    • Breast cancer Neg Hx    • Ovarian cancer Neg Hx    • Uterine cancer Neg Hx    • Diabetes Neg Hx        Social History     Social History   • Marital status: Single     Spouse name: N/A   • Number of children: N/A   • Years of education: N/A     Occupational History   • Not on file.     Social History Main Topics   • Smoking status: Current Every Day Smoker     Packs/day: 0.50     Years: 4.00   • Smokeless tobacco: Never Used   • Alcohol use No   • Drug use: Yes     Special: Marijuana      Comment: admits to trying two weeks ago    • Sexual activity: Yes     Birth control/ protection: Implant      Comment: Nexplanon inserted 4/2013     Other Topics Concern   • Not on file     Social History Narrative   • No narrative on file       Medications:    (Not in a hospital admission)    Allergies:  No Known Allergies      Objective     Physical Exam:  Vital Signs: /82 (BP Location: Right arm, Patient Position: Lying)  Pulse 109  Temp 98.3 °F (36.8 °C) (Oral)   Resp 20  Ht 64\" (162.6 cm)  Wt 245 lb (111 kg)  SpO2 100%  BMI 42.05 kg/m2  Physical Exam   Constitutional: She is oriented to person, place, and time. She appears well-developed and well-nourished. No distress.   Alert oriented x3 pleasant cooperative. Appears comfortable and in no acute distress.    HENT:   Head: Normocephalic and atraumatic.   Mouth/Throat: No oropharyngeal exudate.   Eyes: Conjunctivae and EOM are normal. Pupils are equal, round, and reactive to light. Right eye exhibits no discharge. Left eye exhibits no discharge. No scleral icterus.   Neck: Normal range of motion. Neck supple. No JVD present. No tracheal deviation present. No thyromegaly present.   Cardiovascular: Normal rate, regular rhythm, normal heart sounds and intact distal pulses.  Exam reveals no gallop and no friction rub.    No murmur " heard.  Pulmonary/Chest: Effort normal and breath sounds normal. No stridor. No respiratory distress. She has no wheezes. She has no rales. She exhibits no tenderness.   Abdominal: Soft. Bowel sounds are normal. She exhibits no mass. There is no tenderness. There is no rebound and no guarding. No hernia.   Musculoskeletal: Normal range of motion. She exhibits no edema, tenderness or deformity.   Lymphadenopathy:     She has no cervical adenopathy.   Neurological: She is alert and oriented to person, place, and time. She has normal reflexes.   Skin: Skin is warm and dry. She is not diaphoretic. No erythema. No pallor.   Psychiatric: She has a normal mood and affect. Her behavior is normal. Judgment and thought content normal.   Vitals reviewed.          Results Reviewed:    Results from last 7 days  Lab Units 08/03/17  1406   WBC 10*3/mm3 4.65   HEMOGLOBIN g/dL 15.2   PLATELETS 10*3/mm3 240       Results from last 7 days  Lab Units 08/03/17  1406   SODIUM mmol/L 138   POTASSIUM mmol/L 3.3*   CO2 mmol/L 20.0   CREATININE mg/dL 0.80   GLUCOSE mg/dL 98   CALCIUM mg/dL 9.4     Imaging Results (last 24 hours)     Procedure Component Value Units Date/Time    XR Chest 1 View [[id number]]  (Abnormal) Collected:  08/03/17 2044     Updated:  08/03/17 2142    Narrative:       EXAM:    XR Chest, 1 View    CLINICAL HISTORY:    21 years, female; Signs and symptoms; Other: Tachycardia    TECHNIQUE:    Frontal view of the chest.    COMPARISON:    No relevant prior studies available.    FINDINGS:    Lungs:  Unremarkable.  No consolidation.    Pleural space:  Unremarkable.  No pneumothorax.    Heart:  Unremarkable.  No cardiomegaly.    Mediastinum:  Unremarkable.    Bones/joints:  No acute osseous findings.      Impression:         No acute findings visualized within the chest.    THIS DOCUMENT HAS BEEN ELECTRONICALLY SIGNED BY AYANNA NOLAN MD          I have personally reviewed and interpreted available lab data, radiology studies and  ECG obtained at time of admission.     Assessment / Plan     Problem List:   Hospital Problem List     * (Principal)Intractable vomiting    Hypokalemia    Positive urine drug screen    Tobacco abuse (Chronic)          Assessment:21 year old female who presents to the ED with complaints of nausea vomiting for four days.  Patient was given IV zofran along with three liters of IV fluids.  Patient's symptoms resolved with the zofran but due to continued tachycardia after three liters of fluid, Hospitalist were asked to admit.     Plan:  1) Intractable vomiting  -etiology unclear, ? Related to recent use of THC  -will continue supportive care, prn anti-emetics  -hold IVF, at the time of assessment, patient is drinking yessy mist and eating cracker and tolerating them well.  She is asking for a cheeseburger  -UA negative, was given Rocephin in ED, will hold   -HCG: negative     2) Hypokalemia  -K: 3.3  -secondary to #1  -will replace and re-check in am  -check mag    3) Sinus tachycardia  -improved since arrival  -EKG negative  -TSH pending  =s/p three liters of Normal saline   -tele monitor     4) Tobacco abuse   -cessation education provided  -nicotine patch     5) Positive drug screen   -UDS positive for THC  -patient admits to using once over two weeks ago         DVT prophylaxis:teds/scds, lovenox   Code Status: full code   Admission Status: OBSERVATION status, however if further evaluation or treatment plans warrant, status may change.  Based upon current information, I predict patient's care encounter to be less than or equal to 2 midnights.       IDA Perez 08/03/17 10:19 PM      Brief Attending Note       I have seen and examined the patient, performing an independent face-to-face diagnostic evaluation with plan of care reviewed and developed with the advanced practice clinician IDA Staley.      Brief Summary Statement/HPI:   Ms. Massey is a very pleasant  woman who presents to  [facility] ED today for vomiting for 4 days, unable to tolerate PO at home.  She denies abdominal pain, diarrhea and fever though she has felt hot.  She felt so bad today (weakness, dizzyness and tingling of her fingertips) that she came in to the ER where she has received 3 liters of fluid and yet remains tachycardic and nauseated.  At the time of my visit it has been approximately 2 hours since she last vomited.  She has 2 small children at home and no one else has been sick.  She did smoke marijuana about 2 weeks ago but not recently.  Urine hcg was negative.      Attending Physical Exam:  Temp:  [98.3 °F (36.8 °C)-99.1 °F (37.3 °C)] 98.3 °F (36.8 °C)  Heart Rate:  [] 96  Resp:  [18-20] 20  BP: (107-140)/() 125/89  Constitutional: no acute distress, awake, alert  Eyes: PERRLA, sclerae anicteric, no conjunctival injection  Neck: supple, no thyromegaly, trachea midline  Respiratory: Clear to auscultation bilaterally, nonlabored respirations   Cardiovascular: regular, tachycardic, no murmurs, rubs, or gallops, palpable pedal pulses bilaterally  Gastrointestinal: Positive bowel sounds, soft, nontender, nondistended  Musculoskeletal: No bilateral ankle edema, no clubbing or cyanosis to bilateral lower extremities  Psychiatric: oriented x 3, appropriate affect, cooperative  Neurologic: Strength symmetric in all extremities, Cranial Nerves grossly intact to confrontation       Brief Assessment/Plan :  Tachycardia due to volume depletion:  --Improved but still tachycardic.  --Continue fluids overnight    N/V:  --Improved.  --Continue PRN antiemetics    Hypokalemia:  --Replace and recheck    Nicotine patch  Discussed smoking cessation    Discussed positive UDS and encouraged cessation    See above for further detailed assessment and plan developed with APC which I have reviewed and/or edited.    I believe this patient meets  DEBBIE Ellington MD  08/03/17  11:05 PM

## 2017-08-04 NOTE — PLAN OF CARE
Problem: Patient Care Overview (Adult)  Goal: Plan of Care Review  Outcome: Ongoing (interventions implemented as appropriate)    08/04/17 0147   Coping/Psychosocial Response Interventions   Plan Of Care Reviewed With patient   Patient Care Overview   Progress improving       Goal: Adult Individualization and Mutuality  Outcome: Ongoing (interventions implemented as appropriate)  Goal: Discharge Needs Assessment    08/04/17 0147   Discharge Needs Assessment   Concerns To Be Addressed denies needs/concerns at this time   Readmission Within The Last 30 Days no previous admission in last 30 days   Equipment Needed After Discharge none   Discharge Disposition still a patient   Current Health   Anticipated Changes Related to Illness none   Self-Care   Equipment Currently Used at Home none   Living Environment   Transportation Available car;family or friend will provide         Problem: Constipation (Adult)  Goal: Identify Related Risk Factors and Signs and Symptoms  Outcome: Ongoing (interventions implemented as appropriate)    08/04/17 0147   Constipation   Constipation: Related Risk Factors weakness/fatigue   Signs and Symptoms (Constipation) nausea and vomiting;abdomen firm, tender;hard dry stool       Goal: Effective Bowel Elimination  Outcome: Ongoing (interventions implemented as appropriate)    08/04/17 0147   Constipation (Adult)   Effective Bowel Elimination making progress toward outcome       Goal: Comfort  Outcome: Ongoing (interventions implemented as appropriate)    08/04/17 0147   Constipation (Adult)   Comfort making progress toward outcome

## 2017-08-05 ENCOUNTER — APPOINTMENT (OUTPATIENT)
Dept: CARDIOLOGY | Facility: HOSPITAL | Age: 22
End: 2017-08-05
Attending: HOSPITALIST

## 2017-08-05 LAB
ANION GAP SERPL CALCULATED.3IONS-SCNC: 8 MMOL/L (ref 3–11)
BACTERIA SPEC AEROBE CULT: NORMAL
BUN BLD-MCNC: 7 MG/DL (ref 9–23)
BUN/CREAT SERPL: 10 (ref 7–25)
CALCIUM SPEC-SCNC: 9.5 MG/DL (ref 8.7–10.4)
CHLORIDE SERPL-SCNC: 105 MMOL/L (ref 99–109)
CO2 SERPL-SCNC: 26 MMOL/L (ref 20–31)
CREAT BLD-MCNC: 0.7 MG/DL (ref 0.6–1.3)
GFR SERPL CREATININE-BSD FRML MDRD: 127 ML/MIN/1.73
GLUCOSE BLD-MCNC: 80 MG/DL (ref 70–100)
MAGNESIUM SERPL-MCNC: 2 MG/DL (ref 1.3–2.7)
POTASSIUM BLD-SCNC: 3.9 MMOL/L (ref 3.5–5.5)
SODIUM BLD-SCNC: 139 MMOL/L (ref 132–146)

## 2017-08-05 PROCEDURE — 99226 PR SBSQ OBSERVATION CARE/DAY 35 MINUTES: CPT | Performed by: HOSPITALIST

## 2017-08-05 PROCEDURE — G0378 HOSPITAL OBSERVATION PER HR: HCPCS

## 2017-08-05 PROCEDURE — 93306 TTE W/DOPPLER COMPLETE: CPT

## 2017-08-05 PROCEDURE — 63710000001 PROMETHAZINE PER 12.5 MG: Performed by: HOSPITALIST

## 2017-08-05 PROCEDURE — 80048 BASIC METABOLIC PNL TOTAL CA: CPT | Performed by: HOSPITALIST

## 2017-08-05 PROCEDURE — 83735 ASSAY OF MAGNESIUM: CPT | Performed by: HOSPITALIST

## 2017-08-05 RX ORDER — DOCOSANOL 100 MG/G
CREAM TOPICAL
Status: DISCONTINUED | OUTPATIENT
Start: 2017-08-05 | End: 2017-08-06 | Stop reason: HOSPADM

## 2017-08-05 RX ADMIN — DOCOSANOL: 100 CREAM TOPICAL at 11:45

## 2017-08-05 RX ADMIN — DOCOSANOL: 100 CREAM TOPICAL at 08:55

## 2017-08-05 RX ADMIN — DOCOSANOL: 100 CREAM TOPICAL at 16:03

## 2017-08-05 RX ADMIN — DOCOSANOL: 100 CREAM TOPICAL at 17:13

## 2017-08-05 RX ADMIN — PROMETHAZINE HYDROCHLORIDE 12.5 MG: 12.5 TABLET ORAL at 19:49

## 2017-08-05 RX ADMIN — DOCOSANOL: 100 CREAM TOPICAL at 21:04

## 2017-08-05 RX ADMIN — ONDANSETRON 4 MG: 4 TABLET, FILM COATED ORAL at 08:54

## 2017-08-05 NOTE — PLAN OF CARE
Problem: Cardiac Output, Decreased (Adult)  Goal: Identify Related Risk Factors and Signs and Symptoms  Outcome: Ongoing (interventions implemented as appropriate)  Goal: Adequate Cardiac Output/Effective Tissue Perfusion  Outcome: Ongoing (interventions implemented as appropriate)    Problem: Anxiety (Adult)  Goal: Identify Related Risk Factors and Signs and Symptoms  Outcome: Ongoing (interventions implemented as appropriate)  Goal: Reduction/Resolution  Outcome: Ongoing (interventions implemented as appropriate)    Problem: Nutrition, Imbalanced: Inadequate Oral Intake (Adult)  Goal: Identify Related Risk Factors and Signs and Symptoms  Outcome: Ongoing (interventions implemented as appropriate)    08/05/17 1704   Nutrition, Imbalanced: Inadequate Oral Intake   Nutrition Imbalanced: Less than Body Requirements: Related Risk Factors appetite decreased       Goal: Improved Oral Intake  Outcome: Ongoing (interventions implemented as appropriate)  Goal: Prevent Further Weight Loss  Outcome: Ongoing (interventions implemented as appropriate)

## 2017-08-05 NOTE — PLAN OF CARE
Problem: Patient Care Overview (Adult)  Goal: Plan of Care Review  Outcome: Ongoing (interventions implemented as appropriate)  Goal: Adult Individualization and Mutuality  Outcome: Ongoing (interventions implemented as appropriate)  Goal: Discharge Needs Assessment  Outcome: Ongoing (interventions implemented as appropriate)    Problem: Cardiac Output, Decreased (Adult)  Goal: Identify Related Risk Factors and Signs and Symptoms  Outcome: Ongoing (interventions implemented as appropriate)  Goal: Adequate Cardiac Output/Effective Tissue Perfusion  Outcome: Ongoing (interventions implemented as appropriate)    Problem: Anxiety (Adult)  Goal: Identify Related Risk Factors and Signs and Symptoms  Outcome: Ongoing (interventions implemented as appropriate)  Goal: Reduction/Resolution  Outcome: Ongoing (interventions implemented as appropriate)    Problem: Nutrition, Imbalanced: Inadequate Oral Intake (Adult)  Goal: Identify Related Risk Factors and Signs and Symptoms  Outcome: Ongoing (interventions implemented as appropriate)  Goal: Improved Oral Intake  Outcome: Ongoing (interventions implemented as appropriate)  Goal: Prevent Further Weight Loss  Outcome: Ongoing (interventions implemented as appropriate)

## 2017-08-05 NOTE — PROGRESS NOTES
[facility]  INPATIENT PROGRESS NOTE    Date of Admission: 8/3/2017  Length of Stay: 1  Primary Care Physician: No Known Provider    Subjective   CC:  Palpitations  HPI:    Still with tachycardia and symptomatic, when her heart races she gets short of breath and her fingers tingle and toes tingle. She gets lightheaded when ambulating.  Nausea better and tolerating po. No diarrhea. No overt chest pain.    Review Of Systems:   Review of Systems   Constitutional: Positive for activity change, appetite change and fatigue.   HENT: Negative.    Respiratory: Positive for shortness of breath.    Cardiovascular: Positive for palpitations.   Musculoskeletal: Negative.    Neurological: Positive for numbness.   Hematological: Negative.          Objective      Temp:  [98.2 °F (36.8 °C)-99.6 °F (37.6 °C)] 98.2 °F (36.8 °C)  Heart Rate:  [] 105  Resp:  [18] 18  BP: (107-123)/(76-98) 113/80  Physical Exam  NAD  Op clear, MMM  PERRL  Neck supple  Tachy  CTAB  +BS, ND, NT  GUADARRAMA  No edema    Results Review:    I have reviewed the labs, radiology results and diagnostic studies.      Results from last 7 days  Lab Units 08/04/17  0446   WBC 10*3/mm3 5.66   HEMOGLOBIN g/dL 13.1   PLATELETS 10*3/mm3 211       Results from last 7 days  Lab Units 08/05/17  0703   SODIUM mmol/L 139   POTASSIUM mmol/L 3.9   CHLORIDE mmol/L 105   CO2 mmol/L 26.0   BUN mg/dL 7*   CREATININE mg/dL 0.70   GLUCOSE mg/dL 80   CALCIUM mg/dL 9.5       Culture Data: Cultures:    Blood Culture   Date Value Ref Range Status   08/03/2017 No growth at 24 hours  Preliminary   08/03/2017 No growth at 24 hours  Preliminary     Urine Culture   Date Value Ref Range Status   08/03/2017 >100,000 CFU/mL Normal Urogenital Kathy  Final       Radiology Data:     I have reviewed the medications.    Assessment/Plan     Problem List  Hospital Problem List     * (Principal)Intractable vomiting    Hypokalemia    Positive urine drug screen     Tobacco abuse (Chronic)    Sinus tachycardia        N/V  --better/resolving  Volume depletion  --better/resolved  Tachycardia  --repeat EKG  --K/Mg normal  --TSH normal  --symptomatic  --check ECHO, consult cardiology          DVT prophylaxis:  SCDS  Discharge Planning: I expect patient to be discharged TBD    Cristian Guerra MD   08/05/17   11:03 AM

## 2017-08-06 VITALS
HEIGHT: 64 IN | BODY MASS INDEX: 41.66 KG/M2 | RESPIRATION RATE: 17 BRPM | DIASTOLIC BLOOD PRESSURE: 94 MMHG | SYSTOLIC BLOOD PRESSURE: 126 MMHG | TEMPERATURE: 98.4 F | WEIGHT: 244 LBS | HEART RATE: 86 BPM | OXYGEN SATURATION: 95 %

## 2017-08-06 LAB
BH CV ECHO MEAS - AO MAX PG (FULL): 0.86 MMHG
BH CV ECHO MEAS - AO MAX PG: 6.2 MMHG
BH CV ECHO MEAS - AO MEAN PG (FULL): 0 MMHG
BH CV ECHO MEAS - AO MEAN PG: 3 MMHG
BH CV ECHO MEAS - AO ROOT AREA (BSA CORRECTED): 1.3
BH CV ECHO MEAS - AO ROOT AREA: 6.2 CM^2
BH CV ECHO MEAS - AO ROOT DIAM: 2.8 CM
BH CV ECHO MEAS - AO V2 MAX: 124 CM/SEC
BH CV ECHO MEAS - AO V2 MEAN: 86.1 CM/SEC
BH CV ECHO MEAS - AO V2 VTI: 21.4 CM
BH CV ECHO MEAS - AVA(I,A): 3.1 CM^2
BH CV ECHO MEAS - AVA(I,D): 3.1 CM^2
BH CV ECHO MEAS - AVA(V,A): 3.2 CM^2
BH CV ECHO MEAS - AVA(V,D): 3.2 CM^2
BH CV ECHO MEAS - BSA(HAYCOCK): 2.3 M^2
BH CV ECHO MEAS - BSA: 2.1 M^2
BH CV ECHO MEAS - BZI_BMI: 41.9 KILOGRAMS/M^2
BH CV ECHO MEAS - BZI_METRIC_HEIGHT: 162.6 CM
BH CV ECHO MEAS - BZI_METRIC_WEIGHT: 110.7 KG
BH CV ECHO MEAS - CONTRAST EF (2CH): 70.4 ML/M^2
BH CV ECHO MEAS - CONTRAST EF 4CH: 54.7 ML/M^2
BH CV ECHO MEAS - EDV(CUBED): 72.5 ML
BH CV ECHO MEAS - EDV(MOD-SP2): 81 ML
BH CV ECHO MEAS - EDV(MOD-SP4): 75 ML
BH CV ECHO MEAS - EDV(TEICH): 77.3 ML
BH CV ECHO MEAS - EF(CUBED): 67.1 %
BH CV ECHO MEAS - EF(MOD-SP2): 70.4 %
BH CV ECHO MEAS - EF(MOD-SP4): 54.7 %
BH CV ECHO MEAS - EF(TEICH): 59 %
BH CV ECHO MEAS - ESV(CUBED): 23.9 ML
BH CV ECHO MEAS - ESV(MOD-SP2): 24 ML
BH CV ECHO MEAS - ESV(MOD-SP4): 34 ML
BH CV ECHO MEAS - ESV(TEICH): 31.7 ML
BH CV ECHO MEAS - FS: 30.9 %
BH CV ECHO MEAS - IVS/LVPW: 1.1
BH CV ECHO MEAS - IVSD: 0.93 CM
BH CV ECHO MEAS - LA DIMENSION: 2.5 CM
BH CV ECHO MEAS - LA/AO: 0.89
BH CV ECHO MEAS - LV DIASTOLIC VOL/BSA (35-75): 35.2 ML/M^2
BH CV ECHO MEAS - LV MASS(C)D: 117.5 GRAMS
BH CV ECHO MEAS - LV MASS(C)DI: 55.2 GRAMS/M^2
BH CV ECHO MEAS - LV MAX PG: 5.3 MMHG
BH CV ECHO MEAS - LV MEAN PG: 3 MMHG
BH CV ECHO MEAS - LV SYSTOLIC VOL/BSA (12-30): 16 ML/M^2
BH CV ECHO MEAS - LV V1 MAX: 115 CM/SEC
BH CV ECHO MEAS - LV V1 MEAN: 78.8 CM/SEC
BH CV ECHO MEAS - LV V1 VTI: 19.2 CM
BH CV ECHO MEAS - LVIDD: 4.2 CM
BH CV ECHO MEAS - LVIDS: 2.9 CM
BH CV ECHO MEAS - LVLD AP2: 8.2 CM
BH CV ECHO MEAS - LVLD AP4: 7.3 CM
BH CV ECHO MEAS - LVLS AP2: 6.4 CM
BH CV ECHO MEAS - LVLS AP4: 6.2 CM
BH CV ECHO MEAS - LVOT AREA (M): 3.5 CM^2
BH CV ECHO MEAS - LVOT AREA: 3.5 CM^2
BH CV ECHO MEAS - LVOT DIAM: 2.1 CM
BH CV ECHO MEAS - LVPWD: 0.87 CM
BH CV ECHO MEAS - MED PEAK E' VEL: 14.4 CM/SEC
BH CV ECHO MEAS - MV A MAX VEL: 81 CM/SEC
BH CV ECHO MEAS - MV DEC TIME: 0.24 SEC
BH CV ECHO MEAS - MV E MAX VEL: 63.7 CM/SEC
BH CV ECHO MEAS - MV E/A: 0.79
BH CV ECHO MEAS - PA ACC SLOPE: 591 CM/SEC^2
BH CV ECHO MEAS - PA ACC TIME: 0.12 SEC
BH CV ECHO MEAS - PA PR(ACCEL): 26.8 MMHG
BH CV ECHO MEAS - RVDD: 3.4 CM
BH CV ECHO MEAS - SI(AO): 61.9 ML/M^2
BH CV ECHO MEAS - SI(CUBED): 22.8 ML/M^2
BH CV ECHO MEAS - SI(LVOT): 31.2 ML/M^2
BH CV ECHO MEAS - SI(MOD-SP2): 26.8 ML/M^2
BH CV ECHO MEAS - SI(MOD-SP4): 19.3 ML/M^2
BH CV ECHO MEAS - SI(TEICH): 21.4 ML/M^2
BH CV ECHO MEAS - SV(AO): 131.8 ML
BH CV ECHO MEAS - SV(CUBED): 48.6 ML
BH CV ECHO MEAS - SV(LVOT): 66.5 ML
BH CV ECHO MEAS - SV(MOD-SP2): 57 ML
BH CV ECHO MEAS - SV(MOD-SP4): 41 ML
BH CV ECHO MEAS - SV(TEICH): 45.6 ML
BH CV ECHO MEAS - TAPSE (>1.6): 1.8 CM2
BH CV ECHO MEAS - TR MAX VEL: 223.3 CM/SEC
BH CV VAS BP LEFT ARM: NORMAL MMHG
BH CV XLRA - RV BASE: 3.3 CM
BH CV XLRA - RV LENGTH: 6.3 CM
BH CV XLRA - RV MID: 3.3 CM
BH CV XLRA - TDI S': 13.5 CM/SEC
E/E' RATIO: 5.4

## 2017-08-06 PROCEDURE — G0378 HOSPITAL OBSERVATION PER HR: HCPCS

## 2017-08-06 PROCEDURE — 99217 PR OBSERVATION CARE DISCHARGE MANAGEMENT: CPT | Performed by: HOSPITALIST

## 2017-08-06 RX ORDER — DOCOSANOL 100 MG/G
CREAM TOPICAL
Refills: 0
Start: 2017-08-06 | End: 2020-01-09

## 2017-08-06 RX ORDER — PROMETHAZINE HYDROCHLORIDE 12.5 MG/1
12.5 TABLET ORAL EVERY 6 HOURS PRN
Qty: 15 TABLET | Refills: 0 | OUTPATIENT
Start: 2017-08-06 | End: 2020-01-09

## 2017-08-06 RX ADMIN — DOCOSANOL: 100 CREAM TOPICAL at 05:37

## 2017-08-06 RX ADMIN — ONDANSETRON 4 MG: 4 TABLET, FILM COATED ORAL at 00:29

## 2017-08-06 RX ADMIN — DOCOSANOL: 100 CREAM TOPICAL at 11:13

## 2017-08-06 RX ADMIN — ONDANSETRON 4 MG: 4 TABLET, FILM COATED ORAL at 11:18

## 2017-08-06 NOTE — PLAN OF CARE
Problem: Patient Care Overview (Adult)  Goal: Plan of Care Review  Outcome: Ongoing (interventions implemented as appropriate)  Goal: Adult Individualization and Mutuality  Outcome: Ongoing (interventions implemented as appropriate)  Goal: Discharge Needs Assessment  Outcome: Ongoing (interventions implemented as appropriate)    Problem: Cardiac Output, Decreased (Adult)  Goal: Identify Related Risk Factors and Signs and Symptoms  Outcome: Ongoing (interventions implemented as appropriate)  Goal: Adequate Cardiac Output/Effective Tissue Perfusion  Outcome: Ongoing (interventions implemented as appropriate)    Problem: Anxiety (Adult)  Goal: Identify Related Risk Factors and Signs and Symptoms  Outcome: Ongoing (interventions implemented as appropriate)  Goal: Reduction/Resolution  Outcome: Ongoing (interventions implemented as appropriate)    Problem: Nutrition, Imbalanced: Inadequate Oral Intake (Adult)  Goal: Identify Related Risk Factors and Signs and Symptoms  Outcome: Ongoing (interventions implemented as appropriate)  Goal: Improved Oral Intake  Outcome: Ongoing (interventions implemented as appropriate)

## 2017-08-06 NOTE — PLAN OF CARE
Problem: Anxiety (Adult)  Goal: Identify Related Risk Factors and Signs and Symptoms  Outcome: Ongoing (interventions implemented as appropriate)    08/06/17 1045   Anxiety   Related Risk Factors (Anxiety) loss of control       Goal: Reduction/Resolution  Outcome: Ongoing (interventions implemented as appropriate)

## 2017-08-06 NOTE — DISCHARGE SUMMARY
[facility]  DISCHARGE SUMMARY       Date of Admission: 8/3/2017  Date of Discharge:  8/6/2017  Primary Care Physician: No Known Provider  Consulting Physician(s)     Provider Relationship    Michel Barlow MD Consulting Physician          Discharge Diagnoses:  Active Hospital Problems (** Indicates Principal Problem)    Diagnosis Date Noted   • **Intractable vomiting [R11.10] 08/03/2017   • Hypokalemia [E87.6] 08/03/2017   • Positive urine drug screen [R82.5] 08/03/2017   • Tobacco abuse [Z72.0] 08/03/2017   • Sinus tachycardia [R00.0] 08/03/2017      Resolved Hospital Problems    Diagnosis Date Noted Date Resolved   No resolved problems to display.       Presenting Problem/History of Present Illness  Sinus tachycardia [R00.0]  Sinus tachycardia [R00.0]     Chief Complaint on Day of Discharge:     NONE    History of Present Illness on Day of Discharge:     Tolerating po. No f/c. No n/v. No dyspnea. No dysuria. No abdominal pain, ready to go home.    Hospital Course  Patient is a 21 y.o. female presented with:    N/V  Tachycardia  Volume depletion    She presented with nausea and vomiting possibly related to THC use but maybe related to a viral syndrome. Her symptoms resolved with IVF and Zofran. She will be discharged home. She did remain markedly tachycardic, but it was likely all volume depletion per cardiology. She had an unremarkable ECHO. Cardiology evaluated her and felt she could be safely discharged home.    Procedures Performed         Consults:   Consults     Date and Time Order Name Status Description    8/6/2017 0650 Inpatient Consult to Cardiology      8/5/2017 1102 Inpatient Consult to Cardiology            Pertinent Test Results:      Results        Procedure Component Value Units Date/Time       CBC Auto Differential [[id number]] (Abnormal) Collected: 08/04/17 0446       Lab Status: Final result Specimen: Blood Updated: 08/04/17 0523        WBC 5.66  10*3/mm3         RBC 4.13 10*6/mm3         Hemoglobin 13.1 g/dL         Hematocrit 39.6 %         MCV 95.9 fL         MCH 31.7 (H) pg         MCHC 33.1 g/dL         RDW 12.9 %         RDW-SD 45.1 fl         MPV 9.1 fL         Platelets 211 10*3/mm3         Neutrophil % 47.3 %         Lymphocyte % 42.4 %         Monocyte % 9.5 %         Eosinophil % 0.2 %         Basophil % 0.4 %         Immature Grans % 0.2 %         Neutrophils, Absolute 2.68 10*3/mm3         Lymphocytes, Absolute 2.40 10*3/mm3         Monocytes, Absolute 0.54 10*3/mm3         Eosinophils, Absolute 0.01 10*3/mm3         Basophils, Absolute 0.02 10*3/mm3         Immature Grans, Absolute 0.01 10*3/mm3        Comprehensive Metabolic Panel [[id number]] (Abnormal) Collected: 08/04/17 0446       Lab Status: Final result Specimen: Blood Updated: 08/04/17 0629        Glucose 86 mg/dL         BUN 7 (L) mg/dL         Creatinine 0.70 mg/dL         Sodium 135 mmol/L         Potassium 3.6 mmol/L         Chloride 105 mmol/L         CO2 23.0 mmol/L         Calcium 8.7 mg/dL         Total Protein 7.0 g/dL         Albumin 3.90 g/dL         ALT (SGPT) 23 U/L         AST (SGOT) 30 U/L         Alkaline Phosphatase 51 U/L         Total Bilirubin 0.5 mg/dL         eGFR  African Amer 127 mL/min/1.73         Globulin 3.1 gm/dL         A/G Ratio 1.3 (L) g/dL         BUN/Creatinine Ratio 10.0        Anion Gap 7.0 mmol/L        Narrative:         National Kidney Foundation Guidelines    Stage     Description        GFR  1         Normal or High     90+  2         Mild decrease      60-89  3         Moderate decrease  30-59  4         Severe decrease    15-29  5         Kidney failure     <15       TSH [[id number]] (Normal) Collected: 08/04/17 0446       Lab Status: Final result Specimen: Blood Updated: 08/04/17 0629        TSH 0.738 mIU/mL        Urinalysis With / Culture If Indicated [[id number]] (Abnormal) Collected: 08/03/17 6853       Lab Status: Final result Specimen: Urine  from Urine, Clean Catch Updated: 08/03/17 1446        Color, UA Dark Yellow (A)        Appearance, UA Cloudy (A)        pH, UA 6.0        Specific Gravity, UA 1.037 (H)        Glucose, UA Negative        Ketones, UA >=160 mg/dL (4+) (A)        Bilirubin, UA Moderate (2+) (A)        Blood, UA Large (3+) (A)        Protein, UA 30 mg/dL (1+) (A)        Leuk Esterase, UA Negative        Nitrite, UA Negative        Urobilinogen, UA 1.0 E.U./dL       Urinalysis, Microscopic Only [[id number]] (Abnormal) Collected: 08/03/17 1423       Lab Status: Final result Specimen: Urine from Urine, Clean Catch Updated: 08/03/17 1446        RBC, UA 7-12 (A) /HPF         WBC, UA 6-12 (A) /HPF         Bacteria, UA None Seen /HPF         Squamous Epithelial Cells, UA 3-6 (A) /HPF         Hyaline Casts, UA 21-30 /LPF         Methodology Automated Microscopy       Urine Culture [[id number]] (Normal) Collected: 08/03/17 1423       Lab Status: Preliminary result Specimen: Urine from Urine, Clean Catch Updated: 08/04/17 0905        Urine Culture No growth       Urine Drug Screen [[id number]] (Abnormal) Collected: 08/03/17 1423       Lab Status: Final result Specimen: Urine from Urine, Clean Catch Updated: 08/03/17 2124        THC, Screen, Urine Positive (A)        Phencyclidine (PCP), Urine Negative        Cocaine Screen, Urine Negative        Methamphetamine, Urine Negative        Opiate Screen Negative        Amphetamine Screen, Urine Negative        Benzodiazepine Screen, Urine Negative        Tricyclic Antidepressants Screen Negative        Methadone Screen, Urine Negative        Barbiturates Screen, Urine Negative        Oxycodone Screen, Urine Negative        Propoxyphene Screen Negative        Buprenorphine, Screen, Urine Negative       Narrative:         Cutoff For Drugs Screened:    Amphetamines               500 ng/ml  Barbiturates               200 ng/ml  Benzodiazepines            150 ng/ml  Cocaine                    150  ng/ml  Methadone                  200 ng/ml  Opiates                    100 ng/ml  Phencyclidine               25 ng/ml  THC                         50 ng/ml  Methamphetamine            500 ng/ml  Tricyclic Antidepressants  300 ng/ml  Oxycodone                  100 ng/ml  Propoxyphene               300 ng/ml  Buprenorphine               10 ng/ml    The normal value for all drugs tested is negative. This report includes unconfirmed screening results, with the cutoff values listed, to be used for medical treatment purposes only.  Unconfirmed results must not be used for non-medical purposes such as employment or legal testing.  Clinical consideration should be applied to any drug of abuse test, particularly when unconfirmed results are used.         [place] Draw [[id number]] Collected: 08/03/17 1406       Lab Status: Final result Specimen: Blood Updated: 08/04/17 0626       Narrative:         The following orders were created for panel order [place] Draw.  Procedure                               Abnormality         Status                     ---------                               -----------         ------                     Light Blue Top[[id number]]                                    Final result               Green Top (Gel)[[id number]]                                                              Lavender Top[[id number]]                                                                 Gold Top - SST[[id number]]                                    Final result               Green Top (No Gel)[[id number]]                                                            Please view results for these tests on the individual orders.       Comprehensive Metabolic Panel [[id number]] (Abnormal) Collected: 08/03/17 1406       Lab Status: Final result Specimen: Blood Updated: 08/03/17 1443        Glucose 98 mg/dL         BUN 10 mg/dL         Creatinine 0.80 mg/dL         Sodium 138 mmol/L         Potassium 3.3 (L) mmol/L         Chloride 105  mmol/L         CO2 20.0 mmol/L         Calcium 9.4 mg/dL         Total Protein 8.2 g/dL         Albumin 4.60 g/dL         ALT (SGPT) 35 U/L         AST (SGOT) 33 U/L         Alkaline Phosphatase 58 U/L         Total Bilirubin 0.5 mg/dL         eGFR  African Amer 109 mL/min/1.73         Globulin 3.6 gm/dL         A/G Ratio 1.3 (L) g/dL         BUN/Creatinine Ratio 12.5        Anion Gap 13.0 (H) mmol/L        Narrative:         National Kidney Foundation Guidelines    Stage     Description        GFR  1         Normal or High     90+  2         Mild decrease      60-89  3         Moderate decrease  30-59  4         Severe decrease    15-29  5         Kidney failure     <15       Lipase [[id number]] (Normal) Collected: 08/03/17 1406       Lab Status: Final result Specimen: Blood Updated: 08/03/17 1443        Lipase 31 U/L        CBC Auto Differential [[id number]] (Abnormal) Collected: 08/03/17 1406       Lab Status: Final result Specimen: Blood Updated: 08/03/17 1422        WBC 4.65 10*3/mm3         RBC 4.67 10*6/mm3         Hemoglobin 15.2 g/dL         Hematocrit 44.3 (H) %         MCV 94.9 fL         MCH 32.5 (H) pg         MCHC 34.3 g/dL         RDW 13.1 %         RDW-SD 45.7 fl         MPV 9.4 fL         Platelets 240 10*3/mm3         Neutrophil % 58.6 %         Lymphocyte % 33.5 %         Monocyte % 7.3 %         Eosinophil % 0.2 %         Basophil % 0.2 %         Immature Grans % 0.2 %         Neutrophils, Absolute 2.72 10*3/mm3         Lymphocytes, Absolute 1.56 10*3/mm3         Monocytes, Absolute 0.34 10*3/mm3         Eosinophils, Absolute 0.01 10*3/mm3         Basophils, Absolute 0.01 10*3/mm3         Immature Grans, Absolute 0.01 10*3/mm3        Lactic Acid, Plasma [[id number]] (Normal) Collected: 08/03/17 1406       Lab Status: Final result Specimen: Blood Updated: 08/03/17 1439        Lactate 1.0 mmol/L        [place] Draw [[id number]] Collected: 08/03/17 1406       Lab Status: Final result Specimen: Blood  "Updated: 08/04/17 0626       Narrative:         The following orders were created for panel order [place] Draw.  Procedure                               Abnormality         Status                     ---------                               -----------         ------                     Light Blue Top[[id number]]                                                              Green Top (Gel)[[id number]]                                  Final result               Lavender Top[[id number]]                                     Final result               Gold Top - SST[[id number]]                                                              Green Top (No Gel)[[id number]]                                                            Please view results for these tests on the individual orders.       TSH [[id number]] (Normal) Collected: 08/03/17 1406       Lab Status: Final result Specimen: Blood Updated: 08/03/17 2158        TSH 0.518 mIU/mL           Condition on Discharge:  Stable/improved    Physical Exam on Discharge:/93 (BP Location: Left arm, Patient Position: Lying)  Pulse 97  Temp 98.9 °F (37.2 °C) (Oral)   Resp 18  Ht 64\" (162.6 cm)  Wt 244 lb (111 kg)  SpO2 95%  BMI 41.88 kg/m2  Physical Exam  NAD  Alert and oriented  Op clear, MMM  Neck supple  Tachy, but improved  CTAB  +BS, ND, NT  GUADARRAMA  No edema  Normal affect    Discharge Disposition  Home or Self Care    Discharge Medications   Naomi Massey   Home Medication Instructions AMANDA:[id number]    Printed on:08/06/17 0948   Medication Information                      docosanol (ABREVA) 10 % cream cream  Apply  topically 5 (Five) Times a Day. Give supply she has here             Omeprazole (PRILOSEC PO)  Take  by mouth.             promethazine (PHENERGAN) 12.5 MG tablet  Take 1 tablet by mouth Every 6 (Six) Hours As Needed for Nausea or Vomiting.             RANITIDINE HCL PO  Take  by mouth.                 Discharge Diet:   Diet Instructions     Advance Diet " As Tolerated           Advance Diet As Tolerated                     Discharge Care Plan / Instructions:    Activity at Discharge:   Activity Instructions     Activity as Tolerated           Activity as Tolerated                     Follow-up Appointments  No future appointments.  Additional Instructions for the Follow-ups that You Need to Schedule     Additional Discharge Follow-Up (Specify Provider)    As directed    To:  Needs to establish PCP, can give list of [facility]   Follow Up:  6 Weeks   Has the patient’s follow-up appointment been scheduled and documented in the discharge navigator?:  Patient to schedule, documented in the follow-up section       Discharge Follow-up with PCP    As directed    Follow Up Details:  establish PCP with [facility]                 Test Results Pending at Discharge   Order Current Status    Blood Culture Preliminary result    Blood Culture Preliminary result           Cristian Guerra MD 08/06/17 9:48 AM    Time: 40 minutes    Please note that portions of this note may have been completed with a voice recognition program. Efforts were made to edit the dictations, but occasionally words are mistranscribed.

## 2017-08-06 NOTE — CONSULTS
[facility] Consult Note      Patient Care Team:  No Known Provider as PCP - General  No Known Provider as PCP - Family Medicine  No Known Provider  No ref. provider found    Subjective     History of Present Illness:  Miss Massey is a very pleasant 21-year-old female with a history of tobacco abuse.  She was admitted [facility] on 8/3/17 due to intractable nausea and vomiting.  She states that she had 4 days where she can keep nothing down not even water.  We have been asked to see Miss Massey due to sinus tachycardia.  She has received fluids and now her heart rate is returning to baseline.  She is currently denying any chest pain, shortness of breath, or palpitations.      Current Facility-Administered Medications:   •  acetaminophen (TYLENOL) tablet 650 mg, 650 mg, Oral, Q4H PRN, Ashlie Yaniv, APRN  •  docosanol (ABREVA) 10 % cream, , Topical, 5x Daily, Cristian Guerra MD  •  nicotine (NICODERM CQ) 7 MG/24HR patch 1 patch, 1 patch, Transdermal, Q24H, Ashlie Yaniv, APRN  •  ondansetron (ZOFRAN) tablet 4 mg, 4 mg, Oral, Q6H PRN, 4 mg at 08/06/17 0029 **OR** ondansetron (ZOFRAN) injection 4 mg, 4 mg, Intravenous, Q6H PRN, Ashlie Yaniv, APRN, 4 mg at 08/04/17 1719  •  Pharmacy Meds to Bed Consult, , Does not apply, Daily, Britt Franco, [facility]  •  promethazine (PHENERGAN) tablet 12.5 mg, 12.5 mg, Oral, Q6H PRN, 12.5 mg at 08/05/17 1949 **OR** promethazine (PHENERGAN) injection 12.5 mg, 12.5 mg, Intravenous, Q6H PRN, Cristian Guerra MD, 12.5 mg at 08/04/17 2032  •  sodium chloride 0.9 % flush 1-10 mL, 1-10 mL, Intravenous, PRN, Ashlie Yaniv, APRN  •  sodium chloride 0.9 % flush 10 mL, 10 mL, Intravenous, PRN, Adrian Bangura MD    Social History     Social History   • Marital status: Single     Spouse name: N/A   • Number of children: N/A   • Years of education: N/A     Occupational History   • Not on file.     Social History Main Topics   •  Smoking status: Current Every Day Smoker     Packs/day: 0.50     Years: 4.00   • Smokeless tobacco: Never Used   • Alcohol use No   • Drug use: Yes     Special: Marijuana      Comment: admits to trying two weeks ago    • Sexual activity: Yes     Birth control/ protection: Implant      Comment: Nexplanon inserted 4/2013     Other Topics Concern   • Not on file     Social History Narrative   • No narrative on file       Family History   Problem Relation Age of Onset   • No Known Problems Father    • Hypertension Mother    • Breast cancer Neg Hx    • Ovarian cancer Neg Hx    • Uterine cancer Neg Hx    • Diabetes Neg Hx        Review of Systems   Constitutional: Negative.    HENT: Negative.    Eyes: Negative.    Respiratory: Negative.    Cardiovascular: Positive for palpitations.   Gastrointestinal: Positive for nausea and vomiting.   Endocrine: Negative.    Genitourinary: Negative.    Musculoskeletal: Negative.    Skin: Negative.    Allergic/Immunologic: Negative.    Neurological: Negative.    Hematological: Negative.    Psychiatric/Behavioral: Negative.           Objective     Vital Signs  Temp:  [98.4 °F (36.9 °C)-98.9 °F (37.2 °C)] 98.4 °F (36.9 °C)  Heart Rate:  [86-97] 86  Resp:  [17-18] 17  BP: (114-126)/(93-94) 126/94      Intake/Output Summary (Last 24 hours) at 08/06/17 1037  Last data filed at 08/06/17 0809   Gross per 24 hour   Intake              870 ml   Output                0 ml   Net              870 ml     I/O this shift:  In: 340 [P.O.:340]  Out: -     Physical Exam   Constitutional: She is oriented to person, place, and time. She appears well-developed and well-nourished.   HENT:   Head: Normocephalic and atraumatic.   Eyes: Conjunctivae and EOM are normal. Pupils are equal, round, and reactive to light.   Neck: Normal range of motion. Neck supple. No JVD present. No thyromegaly present.   Cardiovascular: Normal rate, regular rhythm and normal heart sounds.  Exam reveals no gallop and no friction  rub.    No murmur heard.  Pulmonary/Chest: Effort normal and breath sounds normal. She has no wheezes. She has no rales.   Abdominal: Soft. Bowel sounds are normal. She exhibits no distension. There is no tenderness.   Musculoskeletal: Normal range of motion. She exhibits no edema.   Neurological: She is alert and oriented to person, place, and time.   Skin: Skin is warm and dry.   Psychiatric: She has a normal mood and affect.           Results Review:    I reviewed the patient's new clinical results.    WBC WBC   Date/Time Value Ref Range Status   08/04/2017 0446 5.66 3.50 - 10.80 10*3/mm3 Final   08/03/2017 1406 4.65 3.50 - 10.80 10*3/mm3 Final      HGB Hemoglobin   Date/Time Value Ref Range Status   08/04/2017 0446 13.1 11.5 - 15.5 g/dL Final   08/03/2017 1406 15.2 11.5 - 15.5 g/dL Final      HCT Hematocrit   Date/Time Value Ref Range Status   08/04/2017 0446 39.6 34.5 - 44.0 % Final   08/03/2017 1406 44.3 (H) 34.5 - 44.0 % Final      Platlets No results found for: LABPLAT     PT/INR:    No results found for: PROTIME/No results found for: INR    Sodium Sodium   Date/Time Value Ref Range Status   08/05/2017 0703 139 132 - 146 mmol/L Final   08/04/2017 0446 135 132 - 146 mmol/L Final   08/03/2017 1406 138 132 - 146 mmol/L Final      Potassium Potassium   Date/Time Value Ref Range Status   08/05/2017 0703 3.9 3.5 - 5.5 mmol/L Final   08/04/2017 0446 3.6 3.5 - 5.5 mmol/L Final   08/03/2017 1406 3.3 (L) 3.5 - 5.5 mmol/L Final      Chloride Chloride   Date/Time Value Ref Range Status   08/05/2017 0703 105 99 - 109 mmol/L Final   08/04/2017 0446 105 99 - 109 mmol/L Final   08/03/2017 1406 105 99 - 109 mmol/L Final      Bicarbonate No results found for: PLASMABICARB   BUN BUN   Date/Time Value Ref Range Status   08/05/2017 0703 7 (L) 9 - 23 mg/dL Final   08/04/2017 0446 7 (L) 9 - 23 mg/dL Final   08/03/2017 1406 10 9 - 23 mg/dL Final      Creatinine Creatinine   Date/Time Value Ref Range Status   08/05/2017 0703 0.70  0.60 - 1.30 mg/dL Final   08/04/2017 0446 0.70 0.60 - 1.30 mg/dL Final   08/03/2017 1406 0.80 0.60 - 1.30 mg/dL Final      Calcium Calcium   Date/Time Value Ref Range Status   08/05/2017 0703 9.5 8.7 - 10.4 mg/dL Final   08/04/2017 0446 8.7 8.7 - 10.4 mg/dL Final   08/03/2017 1406 9.4 8.7 - 10.4 mg/dL Final      Magnesium Magnesium   Date/Time Value Ref Range Status   08/05/2017 0703 2.0 1.3 - 2.7 mg/dL Final      Troponin       No components found for: TROP                                                EKG: normal sinus rhythm.    Assessment/Plan   Patient Active Problem List   Diagnosis   • Hypokalemia   • Intractable vomiting   • Positive urine drug screen   • Tobacco abuse   • Sinus tachycardia     Normal EF, mild TR  Tachycardia due to dehydration.  Much improved with fluids    I discussed the patients findings and my recommendations with patient and primary care team    CARIDAD Alvarez  08/06/17  10:37 AM

## 2017-08-06 NOTE — PROGRESS NOTES
Continued Stay Note  [facility]     Patient Name: Naomi Massey  MRN: [id number]  Today's Date: 8/6/2017    Admit Date: 8/3/2017          Discharge Plan       08/06/17 1131    Case Management/Social Work Plan    Additional Comments Received a call from patient's nurse, Radha.  Patient has no transportation and is requesting a cab voucher to get home today.  Cab voucher presented to nurse at patient's bedside.                Discharge Codes     None        Expected Discharge Date and Time     Expected Discharge Date Expected Discharge Time    Aug 6, 2017             Dolores Aponte

## 2017-08-08 LAB
BACTERIA SPEC AEROBE CULT: NORMAL
BACTERIA SPEC AEROBE CULT: NORMAL

## 2018-01-29 ENCOUNTER — APPOINTMENT (OUTPATIENT)
Dept: GENERAL RADIOLOGY | Facility: HOSPITAL | Age: 23
End: 2018-01-29

## 2018-01-29 ENCOUNTER — HOSPITAL ENCOUNTER (EMERGENCY)
Facility: HOSPITAL | Age: 23
Discharge: HOME | End: 2018-01-29
Attending: EMERGENCY MEDICINE | Admitting: EMERGENCY MEDICINE

## 2018-01-29 VITALS
BODY MASS INDEX: 42.68 KG/M2 | RESPIRATION RATE: 18 BRPM | SYSTOLIC BLOOD PRESSURE: 124 MMHG | HEART RATE: 98 BPM | WEIGHT: 250 LBS | HEIGHT: 64 IN | TEMPERATURE: 98.8 F | DIASTOLIC BLOOD PRESSURE: 72 MMHG | OXYGEN SATURATION: 99 %

## 2018-01-29 DIAGNOSIS — J20.9 ACUTE BRONCHITIS, UNSPECIFIED ORGANISM: Primary | ICD-10-CM

## 2018-01-29 DIAGNOSIS — R07.89 CHEST WALL PAIN: ICD-10-CM

## 2018-01-29 LAB
B-HCG UR QL: NEGATIVE
INTERNAL NEGATIVE CONTROL: NEGATIVE
INTERNAL POSITIVE CONTROL: POSITIVE
Lab: NORMAL

## 2018-01-29 PROCEDURE — 71046 X-RAY EXAM CHEST 2 VIEWS: CPT

## 2018-01-29 PROCEDURE — 99282 EMERGENCY DEPT VISIT SF MDM: CPT

## 2018-01-29 PROCEDURE — 99283 EMERGENCY DEPT VISIT LOW MDM: CPT

## 2018-01-29 RX ORDER — DEXTROMETHORPHAN HYDROBROMIDE AND PROMETHAZINE HYDROCHLORIDE 15; 6.25 MG/5ML; MG/5ML
5 SYRUP ORAL 4 TIMES DAILY PRN
Qty: 118 ML | Refills: 0 | OUTPATIENT
Start: 2018-01-29 | End: 2020-01-09

## 2018-01-29 RX ORDER — AZITHROMYCIN 250 MG/1
TABLET, FILM COATED ORAL
Qty: 6 TABLET | Refills: 0 | OUTPATIENT
Start: 2018-01-29 | End: 2020-01-09

## 2018-11-29 ENCOUNTER — HOSPITAL ENCOUNTER (EMERGENCY)
Facility: HOSPITAL | Age: 23
Discharge: HOME | End: 2018-11-29
Attending: EMERGENCY MEDICINE | Admitting: EMERGENCY MEDICINE

## 2018-11-29 ENCOUNTER — APPOINTMENT (OUTPATIENT)
Dept: GENERAL RADIOLOGY | Facility: HOSPITAL | Age: 23
End: 2018-11-29

## 2018-11-29 VITALS
WEIGHT: 230 LBS | SYSTOLIC BLOOD PRESSURE: 142 MMHG | RESPIRATION RATE: 18 BRPM | BODY MASS INDEX: 39.27 KG/M2 | HEART RATE: 78 BPM | DIASTOLIC BLOOD PRESSURE: 73 MMHG | OXYGEN SATURATION: 96 % | HEIGHT: 64 IN | TEMPERATURE: 98.4 F

## 2018-11-29 DIAGNOSIS — S29.012A RHOMBOID MUSCLE STRAIN, INITIAL ENCOUNTER: ICD-10-CM

## 2018-11-29 DIAGNOSIS — R30.0 DYSURIA: Primary | ICD-10-CM

## 2018-11-29 DIAGNOSIS — N89.8 VAGINAL DISCHARGE: ICD-10-CM

## 2018-11-29 LAB
B-HCG UR QL: NEGATIVE
BILIRUB UR QL STRIP: NEGATIVE
CLARITY UR: CLEAR
CLUE CELLS SPEC QL WET PREP: ABNORMAL
COLOR UR: YELLOW
GLUCOSE UR STRIP-MCNC: NEGATIVE MG/DL
HGB UR QL STRIP.AUTO: NEGATIVE
HYDATID CYST SPEC WET PREP: ABNORMAL
INTERNAL NEGATIVE CONTROL: NEGATIVE
INTERNAL POSITIVE CONTROL: POSITIVE
KETONES UR QL STRIP: NEGATIVE
KOH PREP NAIL: NORMAL
LEUKOCYTE ESTERASE UR QL STRIP.AUTO: NEGATIVE
Lab: NORMAL
NITRITE UR QL STRIP: NEGATIVE
PH UR STRIP.AUTO: 6 [PH] (ref 5–8)
PROT UR QL STRIP: NEGATIVE
SP GR UR STRIP: 1.02 (ref 1–1.03)
T VAGINALIS SPEC QL WET PREP: ABNORMAL
UROBILINOGEN UR QL STRIP: NORMAL
WBC SPEC QL WET PREP: ABNORMAL
YEAST GENITAL QL WET PREP: ABNORMAL

## 2018-11-29 PROCEDURE — 81003 URINALYSIS AUTO W/O SCOPE: CPT | Performed by: EMERGENCY MEDICINE

## 2018-11-29 PROCEDURE — 87491 CHLMYD TRACH DNA AMP PROBE: CPT | Performed by: PHYSICIAN ASSISTANT

## 2018-11-29 PROCEDURE — 81025 URINE PREGNANCY TEST: CPT | Performed by: EMERGENCY MEDICINE

## 2018-11-29 PROCEDURE — 25010000002 CEFTRIAXONE PER 250 MG: Performed by: PHYSICIAN ASSISTANT

## 2018-11-29 PROCEDURE — 71046 X-RAY EXAM CHEST 2 VIEWS: CPT

## 2018-11-29 PROCEDURE — 87220 TISSUE EXAM FOR FUNGI: CPT | Performed by: PHYSICIAN ASSISTANT

## 2018-11-29 PROCEDURE — 87210 SMEAR WET MOUNT SALINE/INK: CPT | Performed by: PHYSICIAN ASSISTANT

## 2018-11-29 PROCEDURE — 87591 N.GONORRHOEAE DNA AMP PROB: CPT | Performed by: PHYSICIAN ASSISTANT

## 2018-11-29 PROCEDURE — 96372 THER/PROPH/DIAG INJ SC/IM: CPT

## 2018-11-29 PROCEDURE — 99283 EMERGENCY DEPT VISIT LOW MDM: CPT

## 2018-11-29 RX ORDER — CEFTRIAXONE SODIUM 250 MG/1
250 INJECTION, POWDER, FOR SOLUTION INTRAMUSCULAR; INTRAVENOUS ONCE
Status: COMPLETED | OUTPATIENT
Start: 2018-11-29 | End: 2018-11-29

## 2018-11-29 RX ORDER — AZITHROMYCIN 250 MG/1
1000 TABLET, FILM COATED ORAL ONCE
Status: COMPLETED | OUTPATIENT
Start: 2018-11-29 | End: 2018-11-29

## 2018-11-29 RX ORDER — LIDOCAINE HYDROCHLORIDE 10 MG/ML
0.9 INJECTION, SOLUTION EPIDURAL; INFILTRATION; INTRACAUDAL; PERINEURAL ONCE
Status: COMPLETED | OUTPATIENT
Start: 2018-11-29 | End: 2018-11-29

## 2018-11-29 RX ORDER — NAPROXEN 375 MG/1
375 TABLET ORAL 2 TIMES DAILY PRN
Qty: 14 TABLET | Refills: 0 | OUTPATIENT
Start: 2018-11-29 | End: 2020-01-09

## 2018-11-29 RX ORDER — METAXALONE 800 MG/1
800 TABLET ORAL 3 TIMES DAILY PRN
Qty: 20 TABLET | Refills: 0 | OUTPATIENT
Start: 2018-11-29 | End: 2020-01-09

## 2018-11-29 RX ADMIN — AZITHROMYCIN 1000 MG: 250 TABLET, FILM COATED ORAL at 14:33

## 2018-11-29 RX ADMIN — LIDOCAINE HYDROCHLORIDE 0.9 ML: 10 INJECTION, SOLUTION EPIDURAL; INFILTRATION; INTRACAUDAL; PERINEURAL at 14:35

## 2018-11-29 RX ADMIN — CEFTRIAXONE SODIUM 250 MG: 250 INJECTION, POWDER, FOR SOLUTION INTRAMUSCULAR; INTRAVENOUS at 14:35

## 2018-12-01 LAB
C TRACH RRNA SPEC DONR QL NAA+PROBE: NEGATIVE
N GONORRHOEA DNA SPEC QL NAA+PROBE: NEGATIVE

## 2020-01-09 ENCOUNTER — HOSPITAL ENCOUNTER (EMERGENCY)
Facility: HOSPITAL | Age: 25
Discharge: HOME | End: 2020-01-09
Attending: EMERGENCY MEDICINE | Admitting: EMERGENCY MEDICINE

## 2020-01-09 ENCOUNTER — APPOINTMENT (OUTPATIENT)
Dept: GENERAL RADIOLOGY | Facility: HOSPITAL | Age: 25
End: 2020-01-09

## 2020-01-09 VITALS
BODY MASS INDEX: 42.68 KG/M2 | OXYGEN SATURATION: 100 % | TEMPERATURE: 99 F | HEART RATE: 94 BPM | HEIGHT: 64 IN | SYSTOLIC BLOOD PRESSURE: 120 MMHG | WEIGHT: 250 LBS | RESPIRATION RATE: 18 BRPM | DIASTOLIC BLOOD PRESSURE: 58 MMHG

## 2020-01-09 DIAGNOSIS — J06.9 VIRAL UPPER RESPIRATORY TRACT INFECTION WITH COUGH: Primary | ICD-10-CM

## 2020-01-09 LAB
B-HCG UR QL: NEGATIVE
INTERNAL NEGATIVE CONTROL: NORMAL
INTERNAL POSITIVE CONTROL: NORMAL
Lab: NORMAL

## 2020-01-09 PROCEDURE — 71046 X-RAY EXAM CHEST 2 VIEWS: CPT

## 2020-01-09 PROCEDURE — 94640 AIRWAY INHALATION TREATMENT: CPT

## 2020-01-09 PROCEDURE — 81025 URINE PREGNANCY TEST: CPT | Performed by: EMERGENCY MEDICINE

## 2020-01-09 PROCEDURE — 93005 ELECTROCARDIOGRAM TRACING: CPT | Performed by: EMERGENCY MEDICINE

## 2020-01-09 PROCEDURE — 25010000002 DEXAMETHASONE PER 1 MG: Performed by: EMERGENCY MEDICINE

## 2020-01-09 PROCEDURE — 99283 EMERGENCY DEPT VISIT LOW MDM: CPT

## 2020-01-09 RX ORDER — IPRATROPIUM BROMIDE AND ALBUTEROL SULFATE 2.5; .5 MG/3ML; MG/3ML
3 SOLUTION RESPIRATORY (INHALATION) ONCE
Status: COMPLETED | OUTPATIENT
Start: 2020-01-09 | End: 2020-01-09

## 2020-01-09 RX ADMIN — DEXAMETHASONE SODIUM PHOSPHATE 4 MG: 10 INJECTION INTRAMUSCULAR; INTRAVENOUS at 15:28

## 2020-01-09 RX ADMIN — IPRATROPIUM BROMIDE AND ALBUTEROL SULFATE 3 ML: 2.5; .5 SOLUTION RESPIRATORY (INHALATION) at 15:08

## 2020-01-09 NOTE — ED PROVIDER NOTES
Subjective   Naomi Massey is a 24 y.o. female who presents to the emergency department with complaints of chest pain that started 3 days ago. The patient reports that she has a tightness and heaviness sensation in the left lateral chest at this time that increases with breathing. The patient states that she has palpitations, intermittent nausea, diarrhea, and productive cough with yellow sputum. The patient had the flu in November. The patient denies any trauma or injury to her chest wall. The patient denies any vomiting, abdominal pain, heartburn, shortness of breath, or any other acute symptoms at this time. The patient denies any risk factors for cardiac disease.       History provided by:  Patient  Chest Pain   Pain location:  L lateral chest  Pain quality: tightness    Pain severity:  Mild  Onset quality:  Sudden  Duration:  3 days  Timing:  Constant  Progression:  Unchanged  Chronicity:  New  Relieved by:  Nothing  Associated symptoms: nausea and palpitations    Associated symptoms: no abdominal pain, no heartburn, no shortness of breath and no vomiting    Risk factors: obesity and smoking        Review of Systems   Respiratory: Negative for shortness of breath.    Cardiovascular: Positive for chest pain and palpitations.   Gastrointestinal: Positive for nausea. Negative for abdominal pain, heartburn and vomiting.   All other systems reviewed and are negative.      Past Medical History:   Diagnosis Date   • Gonorrhea     treated no complications       No Known Allergies    History reviewed. No pertinent surgical history.    Family History   Problem Relation Age of Onset   • No Known Problems Father    • Hypertension Mother    • Breast cancer Neg Hx    • Ovarian cancer Neg Hx    • Uterine cancer Neg Hx    • Diabetes Neg Hx        Social History     Socioeconomic History   • Marital status: Single     Spouse name: Not on file   • Number of children: Not on file   • Years of education: Not on file   • Highest  education level: Not on file   Tobacco Use   • Smoking status: Current Every Day Smoker     Packs/day: 0.50     Years: 4.00     Pack years: 2.00   • Smokeless tobacco: Never Used   Substance and Sexual Activity   • Alcohol use: No   • Drug use: Yes     Types: Marijuana     Comment: admits to trying two weeks ago    • Sexual activity: Yes     Birth control/protection: Implant     Comment: Nexplanon inserted 4/2013         Objective   Physical Exam   Constitutional: She is oriented to person, place, and time. She appears well-developed and well-nourished. No distress.   HENT:   Head: Normocephalic and atraumatic.   Eyes: Conjunctivae are normal. No scleral icterus.   Neck: Normal range of motion. Neck supple.   Cardiovascular: Normal rate, regular rhythm, normal heart sounds and intact distal pulses.   Pulmonary/Chest: Effort normal and breath sounds normal. No respiratory distress. She has no wheezes. She has no rales.   Abdominal: Soft. There is no tenderness. There is no guarding.   Musculoskeletal: Normal range of motion.   Neurological: She is alert and oriented to person, place, and time.   Skin: Skin is warm and dry. She is not diaphoretic.   Psychiatric: She has a normal mood and affect. Her behavior is normal.   Nursing note and vitals reviewed.      Procedures         ED Course  ED Course as of Jan 09 1818   Thu Jan 09, 2020   1450 Heart Rate: 94 [RS]   1450 Resp: 18 [RS]   1450 SpO2: 100 % [RS]   1537 Patient is resting comfortably.  Chest x-ray demonstrates no emergent or concerning findings.  No evidence of any focal consolidation or infectious process.  Symptoms and presentation most consistent with viral syndrome.  We will discharge the patient with symptomatic management follow-up with her doctor symptoms persist return to the ER for any concerns or worsening.  Patient voices understanding and agrees with plan.    [RS]      ED Course User Index  [RS] Humberto Aguero MD      Recent Results (from  "the past 24 hour(s))   POC Pregnancy, Urine    Collection Time: 01/09/20  3:19 PM   Result Value Ref Range    HCG, Urine, QL Negative Negative    Lot Number [id number]     Internal Positive Control Presumptive Positive     Internal Negative Control Presumptive Negative      Note: In addition to lab results from this visit, the labs listed above may include labs taken at another facility or during a different encounter within the last 24 hours. Please correlate lab times with ED admission and discharge times for further clarification of the services performed during this visit.    XR Chest 2 View   Preliminary Result   No evidence of active airspace disease.       DICTATED:   01/09/2020   EDITED/ls :   01/09/2020                     Vitals:    01/09/20 1413 01/09/20 1508   BP: 120/58    Pulse: 94    Resp: 18 18   Temp: 99 °F (37.2 °C)    SpO2: 100%    Weight: 113 kg (250 lb)    Height: 162.6 cm (64\")      Medications   ipratropium-albuterol (DUO-NEB) nebulizer solution 3 mL (3 mL Nebulization Given 1/9/20 1508)   dexamethasone (DECADRON) 10 MG/ML oral solution 4 mg (4 mg Oral Given 1/9/20 1528)     ECG/EMG Results (last 24 hours)     Procedure Component Value Units Date/Time    ECG 12 Lead [[id number]] Collected:  01/09/20 1431     Updated:  01/09/20 1435    Narrative:       Test Reason : cp  Blood Pressure : **/** mmHG  Vent. Rate : 100 BPM     Atrial Rate : 100 BPM     P-R Int : 124 ms          QRS Dur : 078 ms      QT Int : 356 ms       P-R-T Axes : 000 056 -13 degrees     QTc Int : 459 ms    Normal sinus rhythm    Abnormal ECG  When compared with ECG of 04-AUG-2017 06:33,      Confirmed by MIKE MADRIGAL MD ([id number]) on 1/9/2020 2:35:07 PM    Referred By:  EDMD           Confirmed By:MIKE MADRIGAL MD        ECG 12 Lead   Final Result   Test Reason : cp   Blood Pressure : **/** mmHG   Vent. Rate : 100 BPM     Atrial Rate : 100 BPM      P-R Int : 124 ms          QRS Dur : 078 ms       QT Int : 356 ms       P-R-T Axes : " 000 056 -13 degrees      QTc Int : 459 ms      Normal sinus rhythm      Abnormal ECG   When compared with ECG of 04-AUG-2017 06:33,         Confirmed by HUMBERTO AGUERO MD ([id number]) on 1/9/2020 2:35:07 PM      Referred By:  EDMD           Confirmed By:HUMBERTO AGUERO MD                            MDM  Number of Diagnoses or Management Options  Viral upper respiratory tract infection with cough:      Amount and/or Complexity of Data Reviewed  Tests in the radiology section of CPT®: reviewed  Independent visualization of images, tracings, or specimens: yes        Final diagnoses:   Viral upper respiratory tract infection with cough       Documentation assistance provided by héctor Hung.  Information recorded by the scribe was done at my direction and has been verified and validated by me.     Charlene Hung  01/09/20 1446       Charlene Hung  01/09/20 6869       Charlene Hung  01/09/20 4806       Humberto Aguero MD  01/09/20 6905